# Patient Record
Sex: FEMALE | Race: WHITE | HISPANIC OR LATINO | Employment: OTHER | URBAN - METROPOLITAN AREA
[De-identification: names, ages, dates, MRNs, and addresses within clinical notes are randomized per-mention and may not be internally consistent; named-entity substitution may affect disease eponyms.]

---

## 2018-02-27 ENCOUNTER — OFFICE VISIT CONVERTED (OUTPATIENT)
Dept: FAMILY MEDICINE CLINIC | Facility: CLINIC | Age: 63
End: 2018-02-27
Attending: FAMILY MEDICINE

## 2018-02-27 ENCOUNTER — CONVERSION ENCOUNTER (OUTPATIENT)
Dept: FAMILY MEDICINE CLINIC | Facility: CLINIC | Age: 63
End: 2018-02-27

## 2018-09-18 ENCOUNTER — OFFICE VISIT CONVERTED (OUTPATIENT)
Dept: FAMILY MEDICINE CLINIC | Facility: CLINIC | Age: 63
End: 2018-09-18
Attending: FAMILY MEDICINE

## 2018-09-18 ENCOUNTER — CONVERSION ENCOUNTER (OUTPATIENT)
Dept: FAMILY MEDICINE CLINIC | Facility: CLINIC | Age: 63
End: 2018-09-18

## 2019-09-13 ENCOUNTER — HOSPITAL ENCOUNTER (OUTPATIENT)
Dept: LAB | Facility: HOSPITAL | Age: 64
Discharge: HOME | End: 2019-09-13
Attending: FAMILY MEDICINE

## 2019-09-13 LAB
25(OH)D3 SERPL-MCNC: 75.5 NG/ML (ref 30–100)
ALBUMIN SERPL-MCNC: 4.2 G/DL (ref 3.5–5)
ALBUMIN/GLOB SERPL: 1.3 {RATIO} (ref 1.4–2.6)
ALP SERPL-CCNC: 118 U/L (ref 43–160)
ALT SERPL-CCNC: 45 U/L (ref 10–40)
ANION GAP SERPL CALC-SCNC: 17 MMOL/L (ref 8–19)
APPEARANCE UR: CLEAR
AST SERPL-CCNC: 36 U/L (ref 15–50)
BILIRUB SERPL-MCNC: 0.63 MG/DL (ref 0.2–1.3)
BILIRUB UR QL: NEGATIVE
BUN SERPL-MCNC: 13 MG/DL (ref 5–25)
BUN/CREAT SERPL: 16 {RATIO} (ref 6–20)
CALCIUM SERPL-MCNC: 9.8 MG/DL (ref 8.7–10.4)
CHLORIDE SERPL-SCNC: 100 MMOL/L (ref 99–111)
CHOLEST SERPL-MCNC: 214 MG/DL (ref 107–200)
CHOLEST/HDLC SERPL: 5.1 {RATIO} (ref 3–6)
COLOR UR: YELLOW
CONV CO2: 25 MMOL/L (ref 22–32)
CONV COLLECTION SOURCE (UA): NORMAL
CONV TOTAL PROTEIN: 7.5 G/DL (ref 6.3–8.2)
CONV UROBILINOGEN IN URINE BY AUTOMATED TEST STRIP: 1 {EHRLICHU}/DL (ref 0.1–1)
CREAT UR-MCNC: 0.82 MG/DL (ref 0.5–0.9)
EST. AVERAGE GLUCOSE BLD GHB EST-MCNC: 131 MG/DL
GFR SERPLBLD BASED ON 1.73 SQ M-ARVRAT: >60 ML/MIN/{1.73_M2}
GLOBULIN UR ELPH-MCNC: 3.3 G/DL (ref 2–3.5)
GLUCOSE SERPL-MCNC: 115 MG/DL (ref 65–99)
GLUCOSE UR QL: NEGATIVE MG/DL
HBA1C MFR BLD: 6.2 % (ref 3.5–5.7)
HDLC SERPL-MCNC: 42 MG/DL (ref 40–60)
HGB UR QL STRIP: NEGATIVE
KETONES UR QL STRIP: NEGATIVE MG/DL
LDLC SERPL CALC-MCNC: 142 MG/DL (ref 70–100)
LEUKOCYTE ESTERASE UR QL STRIP: NEGATIVE
NITRITE UR QL STRIP: NEGATIVE
OSMOLALITY SERPL CALC.SUM OF ELEC: 287 MOSM/KG (ref 273–304)
PH UR STRIP.AUTO: 6.5 [PH] (ref 5–8)
POTASSIUM SERPL-SCNC: 4.2 MMOL/L (ref 3.5–5.3)
PROT UR QL: NEGATIVE MG/DL
SODIUM SERPL-SCNC: 138 MMOL/L (ref 135–147)
SP GR UR: 1.02 (ref 1–1.03)
TRIGL SERPL-MCNC: 152 MG/DL (ref 40–150)
TSH SERPL-ACNC: 0.51 M[IU]/L (ref 0.27–4.2)
VLDLC SERPL-MCNC: 30 MG/DL (ref 5–37)

## 2019-09-16 ENCOUNTER — OFFICE VISIT CONVERTED (OUTPATIENT)
Dept: FAMILY MEDICINE CLINIC | Facility: CLINIC | Age: 64
End: 2019-09-16
Attending: FAMILY MEDICINE

## 2019-10-03 ENCOUNTER — HOSPITAL ENCOUNTER (OUTPATIENT)
Dept: GENERAL RADIOLOGY | Facility: HOSPITAL | Age: 64
Discharge: HOME | End: 2019-10-03
Attending: FAMILY MEDICINE

## 2020-01-07 ENCOUNTER — HOSPITAL ENCOUNTER (OUTPATIENT)
Dept: URGENT CARE | Facility: CLINIC | Age: 65
Discharge: HOME | End: 2020-01-07
Attending: FAMILY MEDICINE

## 2020-01-07 LAB — GLUCOSE BLD-MCNC: 93 MG/DL (ref 65–99)

## 2020-01-10 LAB
AMOXICILLIN+CLAV SUSC ISLT: <=2
AMPICILLIN SUSC ISLT: 4
AMPICILLIN+SULBAC SUSC ISLT: <=2
BACTERIA UR CULT: ABNORMAL
CEFAZOLIN SUSC ISLT: <=4
CEFEPIME SUSC ISLT: <=1
CEFTAZIDIME SUSC ISLT: <=1
CEFTRIAXONE SUSC ISLT: <=1
CEFUROXIME ORAL SUSC ISLT: 4
CEFUROXIME PARENTER SUSC ISLT: 4
CIPROFLOXACIN SUSC ISLT: <=0.25
ERTAPENEM SUSC ISLT: <=0.5
GENTAMICIN SUSC ISLT: <=1
LEVOFLOXACIN SUSC ISLT: <=0.12
NITROFURANTOIN SUSC ISLT: <=16
TETRACYCLINE SUSC ISLT: <=1
TMP SMX SUSC ISLT: <=20
TOBRAMYCIN SUSC ISLT: <=1

## 2020-03-16 ENCOUNTER — HOSPITAL ENCOUNTER (OUTPATIENT)
Dept: LAB | Facility: HOSPITAL | Age: 65
Discharge: HOME | End: 2020-03-16
Attending: FAMILY MEDICINE

## 2020-03-16 LAB
25(OH)D3 SERPL-MCNC: 73 NG/ML (ref 30–100)
ALBUMIN SERPL-MCNC: 4.3 G/DL (ref 3.5–5)
ALBUMIN/GLOB SERPL: 1.3 {RATIO} (ref 1.4–2.6)
ALP SERPL-CCNC: 136 U/L (ref 43–160)
ALT SERPL-CCNC: 27 U/L (ref 10–40)
ANION GAP SERPL CALC-SCNC: 14 MMOL/L (ref 8–19)
APPEARANCE UR: CLEAR
AST SERPL-CCNC: 21 U/L (ref 15–50)
BILIRUB SERPL-MCNC: 0.47 MG/DL (ref 0.2–1.3)
BILIRUB UR QL: NEGATIVE
BUN SERPL-MCNC: 10 MG/DL (ref 5–25)
BUN/CREAT SERPL: 11 {RATIO} (ref 6–20)
CALCIUM SERPL-MCNC: 9.8 MG/DL (ref 8.7–10.4)
CHLORIDE SERPL-SCNC: 102 MMOL/L (ref 99–111)
CHOLEST SERPL-MCNC: 173 MG/DL (ref 107–200)
CHOLEST/HDLC SERPL: 3.7 {RATIO} (ref 3–6)
COLOR UR: YELLOW
CONV CO2: 26 MMOL/L (ref 22–32)
CONV COLLECTION SOURCE (UA): NORMAL
CONV TOTAL PROTEIN: 7.5 G/DL (ref 6.3–8.2)
CONV UROBILINOGEN IN URINE BY AUTOMATED TEST STRIP: 0.2 {EHRLICHU}/DL (ref 0.1–1)
CREAT UR-MCNC: 0.91 MG/DL (ref 0.5–0.9)
EST. AVERAGE GLUCOSE BLD GHB EST-MCNC: 114 MG/DL
GFR SERPLBLD BASED ON 1.73 SQ M-ARVRAT: >60 ML/MIN/{1.73_M2}
GLOBULIN UR ELPH-MCNC: 3.2 G/DL (ref 2–3.5)
GLUCOSE SERPL-MCNC: 91 MG/DL (ref 65–99)
GLUCOSE UR QL: NEGATIVE MG/DL
HBA1C MFR BLD: 5.6 % (ref 3.5–5.7)
HDLC SERPL-MCNC: 47 MG/DL (ref 40–60)
HGB UR QL STRIP: NEGATIVE
KETONES UR QL STRIP: NEGATIVE MG/DL
LDLC SERPL CALC-MCNC: 102 MG/DL (ref 70–100)
LEUKOCYTE ESTERASE UR QL STRIP: NEGATIVE
NITRITE UR QL STRIP: NEGATIVE
OSMOLALITY SERPL CALC.SUM OF ELEC: 285 MOSM/KG (ref 273–304)
PH UR STRIP.AUTO: 5 [PH] (ref 5–8)
POTASSIUM SERPL-SCNC: 4 MMOL/L (ref 3.5–5.3)
PROT UR QL: NEGATIVE MG/DL
SODIUM SERPL-SCNC: 138 MMOL/L (ref 135–147)
SP GR UR: 1.02 (ref 1–1.03)
TRIGL SERPL-MCNC: 121 MG/DL (ref 40–150)
TSH SERPL-ACNC: 0.05 M[IU]/L (ref 0.27–4.2)
VLDLC SERPL-MCNC: 24 MG/DL (ref 5–37)

## 2020-03-23 ENCOUNTER — TELEMEDICINE CONVERTED (OUTPATIENT)
Dept: FAMILY MEDICINE CLINIC | Facility: CLINIC | Age: 65
End: 2020-03-23
Attending: FAMILY MEDICINE

## 2020-09-15 ENCOUNTER — HOSPITAL ENCOUNTER (OUTPATIENT)
Dept: LAB | Facility: HOSPITAL | Age: 65
Discharge: HOME | End: 2020-09-15
Attending: FAMILY MEDICINE

## 2020-09-15 LAB
25(OH)D3 SERPL-MCNC: 84.2 NG/ML (ref 30–100)
ALBUMIN SERPL-MCNC: 4.1 G/DL (ref 3.5–5)
ALBUMIN/GLOB SERPL: 1.3 {RATIO} (ref 1.4–2.6)
ALP SERPL-CCNC: 115 U/L (ref 43–160)
ALT SERPL-CCNC: 27 U/L (ref 10–40)
ANION GAP SERPL CALC-SCNC: 16 MMOL/L (ref 8–19)
APPEARANCE UR: CLEAR
AST SERPL-CCNC: 24 U/L (ref 15–50)
BILIRUB SERPL-MCNC: 0.51 MG/DL (ref 0.2–1.3)
BILIRUB UR QL: NEGATIVE
BUN SERPL-MCNC: 13 MG/DL (ref 5–25)
BUN/CREAT SERPL: 13 {RATIO} (ref 6–20)
CALCIUM SERPL-MCNC: 9.8 MG/DL (ref 8.7–10.4)
CHLORIDE SERPL-SCNC: 105 MMOL/L (ref 99–111)
CHOLEST SERPL-MCNC: 200 MG/DL (ref 107–200)
CHOLEST/HDLC SERPL: 3.9 {RATIO} (ref 3–6)
COLOR UR: YELLOW
CONV CO2: 24 MMOL/L (ref 22–32)
CONV COLLECTION SOURCE (UA): NORMAL
CONV TOTAL PROTEIN: 7.2 G/DL (ref 6.3–8.2)
CONV UROBILINOGEN IN URINE BY AUTOMATED TEST STRIP: 0.2 {EHRLICHU}/DL (ref 0.1–1)
CREAT UR-MCNC: 0.98 MG/DL (ref 0.5–0.9)
EST. AVERAGE GLUCOSE BLD GHB EST-MCNC: 114 MG/DL
GFR SERPLBLD BASED ON 1.73 SQ M-ARVRAT: >60 ML/MIN/{1.73_M2}
GLOBULIN UR ELPH-MCNC: 3.1 G/DL (ref 2–3.5)
GLUCOSE SERPL-MCNC: 108 MG/DL (ref 65–99)
GLUCOSE UR QL: NEGATIVE MG/DL
HBA1C MFR BLD: 5.6 % (ref 3.5–5.7)
HDLC SERPL-MCNC: 51 MG/DL (ref 40–60)
HGB UR QL STRIP: NEGATIVE
KETONES UR QL STRIP: NEGATIVE MG/DL
LDLC SERPL CALC-MCNC: 125 MG/DL (ref 70–100)
LEUKOCYTE ESTERASE UR QL STRIP: NEGATIVE
NITRITE UR QL STRIP: NEGATIVE
OSMOLALITY SERPL CALC.SUM OF ELEC: 293 MOSM/KG (ref 273–304)
PH UR STRIP.AUTO: 5 [PH] (ref 5–8)
POTASSIUM SERPL-SCNC: 3.8 MMOL/L (ref 3.5–5.3)
PROT UR QL: NEGATIVE MG/DL
SODIUM SERPL-SCNC: 141 MMOL/L (ref 135–147)
SP GR UR: 1.02 (ref 1–1.03)
T4 FREE SERPL-MCNC: 1.5 NG/DL (ref 0.9–1.8)
TRIGL SERPL-MCNC: 120 MG/DL (ref 40–150)
TSH SERPL-ACNC: 0.1 M[IU]/L (ref 0.27–4.2)
VLDLC SERPL-MCNC: 24 MG/DL (ref 5–37)

## 2020-09-16 LAB — T3FREE SERPL-MCNC: 3.7 PG/ML (ref 2–4.4)

## 2020-09-24 ENCOUNTER — OFFICE VISIT CONVERTED (OUTPATIENT)
Dept: FAMILY MEDICINE CLINIC | Facility: CLINIC | Age: 65
End: 2020-09-24
Attending: FAMILY MEDICINE

## 2020-09-24 ENCOUNTER — CONVERSION ENCOUNTER (OUTPATIENT)
Dept: FAMILY MEDICINE CLINIC | Facility: CLINIC | Age: 65
End: 2020-09-24

## 2020-11-12 ENCOUNTER — HOSPITAL ENCOUNTER (OUTPATIENT)
Dept: ULTRASOUND IMAGING | Facility: HOSPITAL | Age: 65
Discharge: HOME | End: 2020-11-12
Attending: FAMILY MEDICINE

## 2020-12-08 ENCOUNTER — HOSPITAL ENCOUNTER (OUTPATIENT)
Dept: MAMMOGRAPHY | Facility: HOSPITAL | Age: 65
Discharge: HOME | End: 2020-12-08
Attending: FAMILY MEDICINE

## 2020-12-08 LAB
T4 FREE SERPL-MCNC: 1.3 NG/DL (ref 0.9–1.8)
TSH SERPL-ACNC: 0.36 M[IU]/L (ref 0.27–4.2)

## 2020-12-09 LAB — T3FREE SERPL-MCNC: 3.3 PG/ML (ref 2–4.4)

## 2021-02-08 ENCOUNTER — HOSPITAL ENCOUNTER (OUTPATIENT)
Dept: MAMMOGRAPHY | Facility: HOSPITAL | Age: 66
Discharge: HOME | End: 2021-02-08
Attending: FAMILY MEDICINE

## 2021-03-24 ENCOUNTER — HOSPITAL ENCOUNTER (OUTPATIENT)
Dept: LAB | Facility: HOSPITAL | Age: 66
Discharge: HOME | End: 2021-03-24
Attending: FAMILY MEDICINE

## 2021-03-24 LAB
ALBUMIN SERPL-MCNC: 4.1 G/DL (ref 3.5–5)
ALBUMIN/GLOB SERPL: 1.3 {RATIO} (ref 1.4–2.6)
ALP SERPL-CCNC: 126 U/L (ref 43–160)
ALT SERPL-CCNC: 24 U/L (ref 10–40)
ANION GAP SERPL CALC-SCNC: 18 MMOL/L (ref 8–19)
APPEARANCE UR: CLEAR
AST SERPL-CCNC: 23 U/L (ref 15–50)
BILIRUB SERPL-MCNC: 0.48 MG/DL (ref 0.2–1.3)
BILIRUB UR QL: NEGATIVE
BUN SERPL-MCNC: 11 MG/DL (ref 5–25)
BUN/CREAT SERPL: 11 {RATIO} (ref 6–20)
CALCIUM SERPL-MCNC: 9.5 MG/DL (ref 8.7–10.4)
CHLORIDE SERPL-SCNC: 105 MMOL/L (ref 99–111)
CHOLEST SERPL-MCNC: 193 MG/DL (ref 107–200)
CHOLEST/HDLC SERPL: 3.9 {RATIO} (ref 3–6)
COLOR UR: YELLOW
CONV CO2: 23 MMOL/L (ref 22–32)
CONV COLLECTION SOURCE (UA): ABNORMAL
CONV TOTAL PROTEIN: 7.3 G/DL (ref 6.3–8.2)
CONV UROBILINOGEN IN URINE BY AUTOMATED TEST STRIP: 0.2 {EHRLICHU}/DL (ref 0.1–1)
CREAT UR-MCNC: 1.01 MG/DL (ref 0.5–0.9)
EST. AVERAGE GLUCOSE BLD GHB EST-MCNC: 105 MG/DL
GFR SERPLBLD BASED ON 1.73 SQ M-ARVRAT: 59 ML/MIN/{1.73_M2}
GLOBULIN UR ELPH-MCNC: 3.2 G/DL (ref 2–3.5)
GLUCOSE SERPL-MCNC: 96 MG/DL (ref 65–99)
GLUCOSE UR QL: NEGATIVE MG/DL
HBA1C MFR BLD: 5.3 % (ref 3.5–5.7)
HDLC SERPL-MCNC: 50 MG/DL (ref 40–60)
HGB UR QL STRIP: NEGATIVE
KETONES UR QL STRIP: NEGATIVE MG/DL
LDLC SERPL CALC-MCNC: 112 MG/DL (ref 70–100)
LEUKOCYTE ESTERASE UR QL STRIP: NEGATIVE
NITRITE UR QL STRIP: NEGATIVE
OSMOLALITY SERPL CALC.SUM OF ELEC: 293 MOSM/KG (ref 273–304)
PH UR STRIP.AUTO: 5.5 [PH] (ref 5–8)
POTASSIUM SERPL-SCNC: 4 MMOL/L (ref 3.5–5.3)
PROT UR QL: NEGATIVE MG/DL
SODIUM SERPL-SCNC: 142 MMOL/L (ref 135–147)
SP GR UR: >=1.03 (ref 1–1.03)
T4 FREE SERPL-MCNC: 1.3 NG/DL (ref 0.9–1.8)
TRIGL SERPL-MCNC: 155 MG/DL (ref 40–150)
TSH SERPL-ACNC: 0.11 M[IU]/L (ref 0.27–4.2)
VLDLC SERPL-MCNC: 31 MG/DL (ref 5–37)

## 2021-03-25 LAB
25(OH)D3 SERPL-MCNC: 84.3 NG/ML (ref 30–100)
T3FREE SERPL-MCNC: 3.7 PG/ML (ref 2–4.4)

## 2021-03-30 ENCOUNTER — OFFICE VISIT CONVERTED (OUTPATIENT)
Dept: FAMILY MEDICINE CLINIC | Facility: CLINIC | Age: 66
End: 2021-03-30
Attending: FAMILY MEDICINE

## 2021-04-16 ENCOUNTER — HOSPITAL ENCOUNTER (OUTPATIENT)
Dept: GENERAL RADIOLOGY | Facility: HOSPITAL | Age: 66
Discharge: HOME | End: 2021-04-16
Attending: FAMILY MEDICINE

## 2021-05-03 ENCOUNTER — HOSPITAL ENCOUNTER (OUTPATIENT)
Dept: URGENT CARE | Facility: CLINIC | Age: 66
Discharge: HOME | End: 2021-05-03
Attending: FAMILY MEDICINE

## 2021-05-04 LAB — SARS-COV-2 RNA SPEC QL NAA+PROBE: NOT DETECTED

## 2021-05-05 LAB — BACTERIA SPEC AEROBE CULT: NORMAL

## 2021-05-13 NOTE — PROGRESS NOTES
Progress Note      Patient Name: Delores Kaur   Patient ID: [id number]   Sex: Female   YOB: 1955    Primary Care Provider: Vinicio Renee MD   Referring Provider: Vinicio Renee MD    Visit Date: September 24, 2020    Provider: Vinicio Renee MD   Location: [facility]   Location Address: [address] [place] [address], [address] [place] [address]  [id number]   Location Phone: [phone]          Chief Complaint     6 mo f/u         History Of Present Illness  Delores Kaur is a 65 year old /White,  or  female who presents for evaluation and treatment of:      f.u    Mother hx of myelodysplastic syndrome. Father hx of salivary cancer.    hx of HLD.. controlled.     Glucose mildly elevated, A1c from 5.8 to 6.2. to 5.6.     Hx of low vitatmin D. controlled on the vitamin D supplement.    Kidney function is abnormal in past, just mildly abnormal.    hx of thyroid nodules. the FNA done by ENT was negative in 2017.  TSH is mildly hyperfunctionig today.    THe LFT is normal.     Pt c/o weight gain/obesity.. she has lost about 20 lbs since last visit.       Past Medical History  Disease Name Date Onset Notes   Broken Bones --  broken humerous right.    Thyroid nodule --  --          Past Surgical History  Procedure Name Date Notes   Cesarian Section 1983 --    Hysterectomy 2000 total. due to a cyst.   Thyroid FNA 2017 --    Tonsillectomy 1969 --          Medication List  Name Date Started Instructions   cholecalciferol (vitamin D3) 1,250 mcg (50,000 unit) oral capsule 06/29/2020 TAKE 1 CAPSULE BY MOUTH WEEKLY   Ozempic 0.25 mg or 0.5 mg(2 mg/1.5 mL) subcutaneous pen injector 04/13/2020 inject 0.4 milliliter (0.5 mg) by subcutaneous route once weekly on the same day of each week, in the abdomen, thighs, or upper arm rotating injection sites for 90 days   Xiidra 5 % ophthalmic dropperette  instill 1 drop into both eyes by ophthalmic route 2 times per day approximately 12 hours  "apart         Allergy List  Allergen Name Date Reaction Notes   NO KNOWN DRUG ALLERGIES --  --  --        Allergies Reconciled  Family Medical History  Disease Name Relative/Age Notes   Cancer, Unspecified Father/   Oral cancer   Breast Neoplasm Aunt/   --          Reproductive History  Menstrual   Pregnancy Summary   Total Pregnancies: 3 Full Term: 3 Premature: 0   Ab Induced: 0 Ab Spontaneous: 0 Ectopics: 0   Multiples: 0 Livin         Social History  Finding Status Start/Stop Quantity Notes   Tobacco Former --/-- 1 1/2 pk/day Quit in          Immunizations  NameDate Admin Mfg Trade Name Lot Number Route Inj VIS Given VIS Publication   Txvcwsjky84/24/2020 PMC Fluzone Quadrivalent [id number] IM LD 2020    Comments: pt tolerated well   InfluenzaUnknown NE Not Entered  NE NE     Comments: 10/2018   Fwceifcws7535/24/2020 MSD PNEUMOVAX 23 [id number] IM RD 2020    Comments: pt tolerated well   Prevnar 1308 WAL PREVNAR 13 [id number] IM LD 2017   Comments:    Tdap2017 SKB BOOSTRIX [id number] IM RD 2017   Comments:          Review of Systems  · Cardiovascular  o Denies  o : pedal edema, claudication, chest pressure, palpitations  · Respiratory  o Denies  o : shortness of breath, wheezing, cough, hemoptysis, dyspnea on exertion  · Gastrointestinal  o Denies  o : nausea, vomiting, diarrhea, constipation, abdominal pain      Vitals  Date Time BP Position Site L\R Cuff Size HR RR TEMP (F) WT  HT  BMI kg/m2 BSA m2 O2 Sat        2020 09:13 /60 Sitting    77 - R  97.6 195lbs 6oz 5'  3\" 34.61 1.98 98 %          Physical Examination  · Constitutional  o Appearance  o : alert, in no acute distress, well developed, well-nourished  · Head and Face  o Head  o : normocephalic, atraumatic, non tender, no palpable masses or nodules.  o Face  o : no facial lesions  · Eyes  o Vision  o : Acuity: grossly normal at distance, Conjuntivae: Normal, Sclerae " white  · Respiratory  o Auscultation of Lungs  o : normal breath sounds throughout  · Cardiovascular  o Heart  o : Regular rate and rhythm, Normal S1,S2   · Psychiatric  o Mood and Affect  o : normal mood and affect              Assessment  · Visit for screening mammogram     V76.12/Z12.31  · Abnormal thyroid function test     794.5/R94.6  · Hyperthyroidism     242.90/E05.90  · Fatigue     780.79/R53.83  · Hyperlipidemia     272.4/E78.5  · Elevated glucose     790.29/R73.09  · Vitamin D deficiency     268.9/E55.9  · Need for pneumococcal vaccination     V03.82/Z23       repeat thyrod in 3 more months...it is improving. will do thyroid uptake and scan and ultrasound.   mammogram due.  next visit CPX.    labs are improved overall.       Plan  · Orders  o TSH and FT4 (39019, 50117, THYII) - 794.5/R94.6, 242.90/E05.90 - 12/08/2020  o Free T3 (18604) - 794.5/R94.6, 242.90/E05.90 - 12/08/2020  o CMP [facility] (53130) - 242.90/E05.90, 780.79/R53.83, 272.4/E78.5, 790.29/R73.09 - 03/24/2021  o Hgb A1c [facility] (16351) - 790.29/R73.09 - 03/24/2021  o Lipid Panel [facility] (19735) - 272.4/E78.5 - 03/24/2021  o TSH and FT4 (90644, 93335, THYII) - 794.5/R94.6, 242.90/E05.90 - 03/24/2021  o Free T3 (46874) - 794.5/R94.6, 242.90/E05.90 - 03/24/2021  o Urinalysis with Reflex Microscopy if abnormal ([facility]) (32797) - 780.79/R53.83, 790.29/R73.09 - 03/24/2021  o Vitamin D (25-Hydroxy) Level (77419) - 268.9/E55.9 - 03/24/2021  o Thyroid Uptake and Scan I123 [facility] (46113) - 242.90/E05.90 - 10/17/2020  o Mammogram breast screening 3D digital bilateral (69156, 78315, ) - V76.12/Z12.31 - 09/24/2020  o IM/SQ - Injection Fee [facility] (50350) - V03.82/Z23 - 09/24/2020  o Jjasjnoof99 Vaccine (25223) - V03.82/Z23 - 09/24/2020   Vaccine - Vrnqpdiqr27; Dose: 0.50; Site: Right Deltoid; Route: Intramuscular; Date: 09/24/2020 10:03:00; Exp: 03/13/2022; Lot: [id number]; Mfg: [employer] TradeName: PNEUMOVAX 23; Administered By: Tracey Soares MA; Comment: pt  tolerated well  o Fluzone Quadrivalent Vaccine, age 6 months + (91491) - - 09/24/2020   Vaccine - Influenza; Dose: 0.5; Site: Left Deltoid; Route: Intramuscular; Date: 09/24/2020 10:04:00; Exp: 06/30/2021; Lot: [id number]; Mfg: sanofi pasteur; TradeName: Fluzone Quadrivalent; Administered By: Tracey Soares MA; Comment: pt tolerated well  o ACO-14: Influenza immunization administered or previously received () - - 09/24/2020  o ACO-39: Current medications updated and reviewed () - - 09/24/2020  · Medications  o Macrobid 100 mg oral capsule   SIG: take 1 capsule (100 mg) by oral route every 12 hours with food for 7 days   DISP: (14) capsules with 0 refills  Discontinued on 09/24/2020     o Mucinex 600 mg oral tablet extended release 12hr   SIG: take 1 tablet (600 mg) by oral route every 12 hours as needed   DISP: (30) Tablet extended release 12hrs with 0 refills  Discontinued on 09/24/2020     o Medications have been Reconciled  o Transition of Care or Provider Policy  · Instructions  o Electronically Identified Patient Education Materials Provided Electronically  · Disposition  o Call or Return if symptoms worsen or persist.  o Care Transition            Electronically Signed by: Vinicio Renee MD -Author on September 24, 2020 12:30:45 PM

## 2021-05-14 VITALS
HEIGHT: 63 IN | WEIGHT: 203 LBS | BODY MASS INDEX: 35.97 KG/M2 | OXYGEN SATURATION: 97 % | RESPIRATION RATE: 18 BRPM | TEMPERATURE: 97.1 F | DIASTOLIC BLOOD PRESSURE: 73 MMHG | SYSTOLIC BLOOD PRESSURE: 120 MMHG | HEART RATE: 71 BPM

## 2021-05-14 VITALS
DIASTOLIC BLOOD PRESSURE: 60 MMHG | WEIGHT: 195.37 LBS | OXYGEN SATURATION: 98 % | HEART RATE: 77 BPM | BODY MASS INDEX: 34.62 KG/M2 | SYSTOLIC BLOOD PRESSURE: 120 MMHG | HEIGHT: 63 IN | TEMPERATURE: 97.6 F

## 2021-05-14 NOTE — PROGRESS NOTES
Progress Note      Patient Name: Delores Kaur   Patient ID: [id number]   Sex: Female   YOB: 1955    Primary Care Provider: Vinicio Renee MD   Referring Provider: Vinicio Renee MD    Visit Date: March 30, 2021    Provider: Vinicio Renee MD   Location: [facility]   Location Address: [address] [place] [address], [address] [place] [address]  [id number]   Location Phone: [phone]          Chief Complaint  · Annual Exam  · (Health Maintainence Information Reviewed Under Results)      History Of Present Illness  Delores Kaur is a 66 year old /White,  or  female who presents for evaluation and treatment of:   Last PAP Smear: no.   Date of Last Mammogram: 2021.   Date of Last Colonoscopy: Unknown       Past Medical History  Disease Name Date Onset Notes   Broken Bones --  broken humerous right.    Thyroid nodule --  --          Past Surgical History  Procedure Name Date Notes   Cesarian Section 1983 --    Hysterectomy 2000 total. due to a cyst.   Thyroid FNA 2017 --    Tonsillectomy 1969 --          Medication List  Name Date Started Instructions   cholecalciferol (vitamin D3) 1,250 mcg (50,000 unit) oral capsule 03/30/2021 TAKE 1 CAPSULE BY MOUTH WEEKLY   Ozempic 0.25 mg or 0.5 mg(2 mg/1.5 mL) subcutaneous pen injector 03/30/2021 inject 0.4 milliliter (0.5 mg) by subcutaneous route once weekly on the same day of each week, in the abdomen, thighs, or upper arm rotating injection sites for 90 days   Xiidra 5 % ophthalmic dropperette  instill 1 drop into both eyes by ophthalmic route 2 times per day approximately 12 hours apart         Allergy List  Allergen Name Date Reaction Notes   NO KNOWN DRUG ALLERGIES --  --  --        Allergies Reconciled  Family Medical History  Disease Name Relative/Age Notes   Cancer, Unspecified Father/   Oral cancer   Breast Neoplasm Aunt/   --          Reproductive History  Menstrual   Pregnancy Summary   Total Pregnancies: 3 Full Term: 3  "Premature: 0   Ab Induced: 0 Ab Spontaneous: 0 Ectopics: 0   Multiples: 0 Livin         Social History  Finding Status Start/Stop Quantity Notes   Tobacco Former --/-- 1 1/2 pk/day Quit in          Immunizations  NameDate Admin Mfg Trade Name Lot Number Route Inj VIS Given VIS Publication   Qkoptttil69/24/2020 PMC Fluzone Quadrivalent [id number] IM LD 2020    Comments: pt tolerated well   Cvddyaptn8121/24/2020 MSD PNEUMOVAX 23 [id number] IM RD 2020    Comments: pt tolerated well   Prevnar 1302017 WAL PREVNAR 13 [id number] IM LD 2017   Comments:    Tdap2017 SKB BOOSTRIX [id number] IM RD 2017   Comments:          Review of Systems  · Constitutional  o Denies  o : night sweats  · Eyes  o Denies  o : double vision, blurred vision  · HENT  o Denies  o : vertigo, recent head injury  · Breasts  o Denies  o : abnormal changes in breast size, additional breast symptoms except as noted in the HPI  · Cardiovascular  o Denies  o : chest pain, irregular heart beats  · Respiratory  o Denies  o : shortness of breath, productive cough  · Gastrointestinal  o Denies  o : nausea, vomiting  · Genitourinary  o Denies  o : dysuria, urinary retention  · Integument  o Denies  o : hair growth change, new skin lesions  · Neurologic  o Denies  o : altered mental status, seizures  · Musculoskeletal  o Denies  o : joint swelling, limitation of motion  · Endocrine  o Denies  o : cold intolerance, heat intolerance  · Heme-Lymph  o Denies  o : petechiae, lymph node enlargement or tenderness  · Allergic-Immunologic  o Denies  o : frequent illnesses      Vitals  Date Time BP Position Site L\R Cuff Size HR RR TEMP (F) WT  HT  BMI kg/m2 BSA m2 O2 Sat FR L/min FiO2 HC       2021 08:25 /73 Sitting    71 - R 18 97.1 203lbs 0oz 5'  3\" 35.96 2.02 97 %  21%          Physical Examination  · Constitutional  o Appearance  o : well-nourished, in no acute distress  · Neck  o Inspection/Palpation  o : " normal appearance, no masses or tenderness, trachea midline  o Thyroid  o : gland size normal, nontender, no nodules or masses present on palpation  · Respiratory  o Respiratory Effort  o : breathing unlabored  o Inspection of Chest  o : normal appearance  o Auscultation of Lungs  o : normal breath sounds throughout  · Cardiovascular  o Heart  o :   § Auscultation of Heart  § : regular rate and rhythm  · Gastrointestinal  o Abdominal Examination  o : abdomen nontender to palpation, tone normal without rigidity or guarding, no masses present, normal bowel sounds  · Psychiatric  o Judgement and Insight  o : judgment and insight intact  o Mood and Affect  o : mood normal, affect appropriate              Assessment  · Annual physical exam     V70.0/Z00.00  · Fatigue     780.79/R53.83  · Vitamin D deficiency     268.9/E55.9  · Post menopausal syndrome     627.9/N95.1  · Screening for breast cancer     V76.10/Z12.39  · HLD (hyperlipidemia)     272.4/E78.5  · Abnormal thyroid function test     794.5/R94.6  · Elevated glucose     790.29/R73.09  · Immunity status testing     V72.61/Z01.84       she needs mammogram left breast in AUgust  screening mammogram in Dec.  need last cscope report from [facility].  Dexa due.   goal is to lose 5 lbs.   ultrasound kidneys..due to abnormal kidney function.       Plan  · Orders  o ACO-14: Influenza immunization administered or previously received [facility] () - - 03/30/2021  o ACO-39: Current medications updated and reviewed (1159F, ) - - 03/30/2021  o CMP [facility] (11037) - 272.4/E78.5, 794.5/R94.6, 790.29/R73.09, 780.79/R53.83 - 09/30/2021  o Hgb A1c [facility] (25480) - 272.4/E78.5, 794.5/R94.6, 790.29/R73.09, 780.79/R53.83 - 09/30/2021  o Lipid Panel [facility] (30185) - 272.4/E78.5 - 09/30/2021  o TSH and FT4 (95140, 31501, THYII) - 272.4/E78.5, 794.5/R94.6, 790.29/R73.09, 780.79/R53.83 - 09/30/2021  o Free T3 (19925) - 272.4/E78.5, 794.5/R94.6, 780.79/R53.83, 268.9/E55.9 - 09/30/2021  o Urinalysis  with Reflex Microscopy (90122) - 272.4/E78.5, 794.5/R94.6, 790.29/R73.09, 780.79/R53.83 - 09/30/2021  o Vitamin D (25-Hydroxy) Level (17142) - 780.79/R53.83, 268.9/E55.9 - 09/30/2021  o Mammogram diagnostic 3D breast unilateral LEFT (w/US if needed) [facility] (, -[facility]) - V76.10/Z12.39 - 08/30/2021  o Mammogram breast screening 3D digital bilateral (02420, 59105, ) - V76.10/Z12.39 - 12/30/2021  o Bone density scan (36923) - 627.9/N95.1 - 03/30/2021  o Varicella Zoster Antibody (IgG) (35734) - V72.61/Z01.84 - 09/30/2021  o Varicella Zoster IgM [facility] (44091) - V72.61/Z01.84 - 09/30/2021  · Medications  o Medications have been Reconciled  o Transition of Care or Provider Policy  · Instructions  o Reviewed health maintenance flowsheet and updated information. Orders were placed and/or patient's response was documented.  o Patient was educated/instructed on their diagnosis, treatment and medications prior to discharge from the clinic today.  o Counseled on monthly breast self exams.   o Counseled on STD prevention.  o Counseled on diet and exercise.   o Counseled on weight-bearing exercise.  o Recommended Calcium with Vitamin D twice daily.  o Electronically Identified Patient Education Materials Provided Electronically  · Disposition  o Call or Return if symptoms worsen or persist.  o Care Transition            Electronically Signed by: Vinicio Renee MD -Author on March 30, 2021 12:43:49 PM

## 2021-05-15 VITALS
SYSTOLIC BLOOD PRESSURE: 118 MMHG | BODY MASS INDEX: 38.64 KG/M2 | OXYGEN SATURATION: 98 % | DIASTOLIC BLOOD PRESSURE: 58 MMHG | HEART RATE: 63 BPM | TEMPERATURE: 97.3 F | WEIGHT: 218.06 LBS | HEIGHT: 63 IN

## 2021-05-16 VITALS
DIASTOLIC BLOOD PRESSURE: 57 MMHG | BODY MASS INDEX: 39.34 KG/M2 | HEIGHT: 63 IN | WEIGHT: 222 LBS | TEMPERATURE: 97.5 F | OXYGEN SATURATION: 96 % | HEART RATE: 70 BPM | SYSTOLIC BLOOD PRESSURE: 107 MMHG

## 2021-05-16 VITALS
DIASTOLIC BLOOD PRESSURE: 60 MMHG | SYSTOLIC BLOOD PRESSURE: 123 MMHG | HEART RATE: 69 BPM | HEIGHT: 63 IN | WEIGHT: 218.25 LBS | OXYGEN SATURATION: 97 % | TEMPERATURE: 97.7 F | BODY MASS INDEX: 38.67 KG/M2

## 2021-05-22 ENCOUNTER — TRANSCRIBE ORDERS (OUTPATIENT)
Dept: ADMINISTRATIVE | Facility: HOSPITAL | Age: 66
End: 2021-05-22

## 2021-05-22 DIAGNOSIS — R92.8 ABNORMAL SCREENING MAMMOGRAM: Primary | ICD-10-CM

## 2021-05-22 DIAGNOSIS — Z12.31 SCREENING MAMMOGRAM, ENCOUNTER FOR: ICD-10-CM

## 2021-05-22 DIAGNOSIS — R92.8 ABNORMAL MAMMOGRAM: ICD-10-CM

## 2021-06-12 ENCOUNTER — TRANSCRIBE ORDERS (OUTPATIENT)
Dept: ADMINISTRATIVE | Facility: HOSPITAL | Age: 66
End: 2021-06-12

## 2021-06-12 DIAGNOSIS — E04.1 THYROID NODULE: Primary | ICD-10-CM

## 2021-07-19 ENCOUNTER — HOSPITAL ENCOUNTER (OUTPATIENT)
Dept: ULTRASOUND IMAGING | Facility: HOSPITAL | Age: 66
Discharge: HOME | End: 2021-07-19
Admitting: FAMILY MEDICINE

## 2021-07-19 DIAGNOSIS — E04.1 THYROID NODULE: ICD-10-CM

## 2021-07-19 PROCEDURE — 76536 US EXAM OF HEAD AND NECK: CPT

## 2021-08-02 ENCOUNTER — HOSPITAL ENCOUNTER (OUTPATIENT)
Dept: MAMMOGRAPHY | Facility: HOSPITAL | Age: 66
Discharge: HOME | End: 2021-08-02

## 2021-08-02 ENCOUNTER — HOSPITAL ENCOUNTER (OUTPATIENT)
Dept: BONE DENSITY | Facility: HOSPITAL | Age: 66
Discharge: HOME | End: 2021-08-02

## 2021-08-02 DIAGNOSIS — R92.8 ABNORMAL SCREENING MAMMOGRAM: ICD-10-CM

## 2021-08-02 DIAGNOSIS — R92.8 ABNORMAL MAMMOGRAM: ICD-10-CM

## 2021-08-02 PROCEDURE — 77065 DX MAMMO INCL CAD UNI: CPT

## 2021-08-02 PROCEDURE — 77080 DXA BONE DENSITY AXIAL: CPT | Performed by: RADIOLOGY

## 2021-08-02 PROCEDURE — 77065 DX MAMMO INCL CAD UNI: CPT | Performed by: RADIOLOGY

## 2021-08-02 PROCEDURE — 77080 DXA BONE DENSITY AXIAL: CPT

## 2021-08-02 PROCEDURE — G0279 TOMOSYNTHESIS, MAMMO: HCPCS

## 2021-08-02 PROCEDURE — 77061 BREAST TOMOSYNTHESIS UNI: CPT | Performed by: RADIOLOGY

## 2021-08-06 ENCOUNTER — TELEPHONE (OUTPATIENT)
Dept: FAMILY MEDICINE CLINIC | Facility: CLINIC | Age: 66
End: 2021-08-06

## 2021-08-06 NOTE — TELEPHONE ENCOUNTER
Caller: Delores Kaur    Relationship to patient: Self    Best call back number: [phone]    Patient is needing:PATIENT CALLED IN AND WOULD LIKE TO HAVE THE RESULTS OF HER TESTS ON 8/2/2021. PLEASE CALL PATIENT AND ADVISE.

## 2021-08-12 ENCOUNTER — TELEPHONE (OUTPATIENT)
Dept: FAMILY MEDICINE CLINIC | Facility: CLINIC | Age: 66
End: 2021-08-12

## 2021-08-12 RX ORDER — SEMAGLUTIDE 1.34 MG/ML
0.53 INJECTION, SOLUTION SUBCUTANEOUS WEEKLY
Qty: 4 PEN | Refills: 1 | Status: SHIPPED | OUTPATIENT
Start: 2021-08-12 | End: 2021-08-17 | Stop reason: SDUPTHER

## 2021-08-12 RX ORDER — SEMAGLUTIDE 1.34 MG/ML
0.4 INJECTION, SOLUTION SUBCUTANEOUS WEEKLY
COMMUNITY
Start: 2021-03-30 | End: 2021-08-12 | Stop reason: SDUPTHER

## 2021-08-12 NOTE — TELEPHONE ENCOUNTER
Patient called and said she is calling for US, bone density and mammogram results.  She also needs a refill on her Ozempic.

## 2021-08-13 DIAGNOSIS — Z12.31 ENCOUNTER FOR SCREENING MAMMOGRAM FOR BREAST CANCER: Primary | ICD-10-CM

## 2021-08-13 RX ORDER — BLOOD SUGAR DIAGNOSTIC
1 STRIP MISCELLANEOUS WEEKLY
Qty: 50 EACH | Refills: 1 | Status: SHIPPED | OUTPATIENT
Start: 2021-08-13 | End: 2021-11-09 | Stop reason: SDUPTHER

## 2021-08-13 NOTE — TELEPHONE ENCOUNTER
----- Message from Vinicio Renee MD sent at 8/4/2021  7:04 PM EDT -----  Please call and let pt know that the left diagnostic mammogram was benign. She can go back to routine screenings. Due in December this year.  Get pt scheduled for bilateral screening mammogram for Dec 2021. Thanks.

## 2021-08-17 RX ORDER — SEMAGLUTIDE 1.34 MG/ML
0.5 INJECTION, SOLUTION SUBCUTANEOUS WEEKLY
Qty: 4 PEN | Refills: 1 | Status: SHIPPED | OUTPATIENT
Start: 2021-08-17 | End: 2021-11-09 | Stop reason: SDUPTHER

## 2021-08-24 ENCOUNTER — TELEPHONE (OUTPATIENT)
Dept: FAMILY MEDICINE CLINIC | Facility: CLINIC | Age: 66
End: 2021-08-24

## 2021-08-24 NOTE — TELEPHONE ENCOUNTER
Patient also stated that she stopped the Ozempic 2 weeks ago due to her insurance and the pharmacy. She okay to stay off it until her appt in November.

## 2021-08-24 NOTE — TELEPHONE ENCOUNTER
Patient requesting DEXA results. I see in the chart that they were done on 8/2/21 same day as her Mammogram. Printing to let Dr Renee to review.

## 2021-08-24 NOTE — TELEPHONE ENCOUNTER
Caller: Delores Kaur    Relationship: Self    Best call back number: [phone]    Caller requesting test results: PATIENT    What test was performed: BONE DENSITY    When was the test performed: 8/2/21    Where was the test performed: [facility]    Additional notes: PATIENT WOULD LIKE TO KNOW HER RESULTS FROM THE BONE DENSITY. ATTEMPTED TO WARM TRANSFER. PLEASE CALL PATIENT TO ADVISE. SHE WOULD LIKE A PHONE CALL TODAY IF POSSIBLE.

## 2021-08-31 ENCOUNTER — TELEPHONE (OUTPATIENT)
Dept: FAMILY MEDICINE CLINIC | Facility: CLINIC | Age: 66
End: 2021-08-31

## 2021-08-31 DIAGNOSIS — Z01.84 IMMUNITY STATUS TESTING: ICD-10-CM

## 2021-08-31 DIAGNOSIS — R94.6 ABNORMAL THYROID FUNCTION TEST: ICD-10-CM

## 2021-08-31 DIAGNOSIS — E78.5 HYPERLIPIDEMIA, UNSPECIFIED HYPERLIPIDEMIA TYPE: Primary | ICD-10-CM

## 2021-08-31 DIAGNOSIS — R53.83 FATIGUE, UNSPECIFIED TYPE: ICD-10-CM

## 2021-08-31 DIAGNOSIS — E55.9 VITAMIN D DEFICIENCY: ICD-10-CM

## 2021-08-31 DIAGNOSIS — R73.09 ELEVATED GLUCOSE: ICD-10-CM

## 2021-10-20 ENCOUNTER — TELEPHONE (OUTPATIENT)
Dept: FAMILY MEDICINE CLINIC | Facility: CLINIC | Age: 66
End: 2021-10-20

## 2021-10-20 DIAGNOSIS — R94.6 ABNORMAL THYROID FUNCTION TEST: ICD-10-CM

## 2021-10-20 DIAGNOSIS — E78.5 HYPERLIPIDEMIA, UNSPECIFIED HYPERLIPIDEMIA TYPE: Primary | ICD-10-CM

## 2021-10-20 DIAGNOSIS — R53.83 FATIGUE, UNSPECIFIED TYPE: ICD-10-CM

## 2021-10-20 DIAGNOSIS — Z01.84 IMMUNITY STATUS TESTING: ICD-10-CM

## 2021-10-20 DIAGNOSIS — E55.9 VITAMIN D DEFICIENCY: ICD-10-CM

## 2021-10-20 DIAGNOSIS — R73.09 ELEVATED GLUCOSE: ICD-10-CM

## 2021-10-20 NOTE — TELEPHONE ENCOUNTER
Caller: Delores Kaur    Relationship: Self    Best call back number: [phone]    What orders are you requesting (i.e. lab or imaging): BLOOD WORK    In what timeframe would the patient need to come in: BEFORE APPOINTMENT ON 11.09.2021    Where will you receive your lab/imaging services: [facility]

## 2021-11-04 ENCOUNTER — LAB (OUTPATIENT)
Dept: LAB | Facility: HOSPITAL | Age: 66
End: 2021-11-04

## 2021-11-04 DIAGNOSIS — Z01.84 IMMUNITY STATUS TESTING: ICD-10-CM

## 2021-11-04 DIAGNOSIS — E78.5 HYPERLIPIDEMIA, UNSPECIFIED HYPERLIPIDEMIA TYPE: ICD-10-CM

## 2021-11-04 DIAGNOSIS — E55.9 VITAMIN D DEFICIENCY: ICD-10-CM

## 2021-11-04 DIAGNOSIS — R94.6 ABNORMAL THYROID FUNCTION TEST: ICD-10-CM

## 2021-11-04 DIAGNOSIS — R53.83 FATIGUE, UNSPECIFIED TYPE: ICD-10-CM

## 2021-11-04 DIAGNOSIS — R73.09 ELEVATED GLUCOSE: ICD-10-CM

## 2021-11-04 LAB
25(OH)D3 SERPL-MCNC: 55.6 NG/ML
ALBUMIN SERPL-MCNC: 4.4 G/DL (ref 3.5–5.2)
ALBUMIN/GLOB SERPL: 1.4 G/DL
ALP SERPL-CCNC: 131 U/L (ref 39–117)
ALT SERPL W P-5'-P-CCNC: 28 U/L (ref 1–33)
ANION GAP SERPL CALCULATED.3IONS-SCNC: 7.6 MMOL/L (ref 5–15)
AST SERPL-CCNC: 21 U/L (ref 1–32)
BASOPHILS # BLD AUTO: 0.04 10*3/MM3 (ref 0–0.2)
BASOPHILS NFR BLD AUTO: 0.6 % (ref 0–1.5)
BILIRUB SERPL-MCNC: 0.7 MG/DL (ref 0–1.2)
BILIRUB UR QL STRIP: NEGATIVE
BUN SERPL-MCNC: 12 MG/DL (ref 8–23)
BUN/CREAT SERPL: 14.3 (ref 7–25)
CALCIUM SPEC-SCNC: 10 MG/DL (ref 8.6–10.5)
CHLORIDE SERPL-SCNC: 106 MMOL/L (ref 98–107)
CHOLEST SERPL-MCNC: 208 MG/DL (ref 0–200)
CLARITY UR: ABNORMAL
CO2 SERPL-SCNC: 27.4 MMOL/L (ref 22–29)
COLOR UR: YELLOW
CREAT SERPL-MCNC: 0.84 MG/DL (ref 0.57–1)
DEPRECATED RDW RBC AUTO: 41.4 FL (ref 37–54)
EOSINOPHIL # BLD AUTO: 0.14 10*3/MM3 (ref 0–0.4)
EOSINOPHIL NFR BLD AUTO: 2.2 % (ref 0.3–6.2)
ERYTHROCYTE [DISTWIDTH] IN BLOOD BY AUTOMATED COUNT: 12.6 % (ref 12.3–15.4)
FOLATE SERPL-MCNC: 9.45 NG/ML (ref 4.78–24.2)
GFR SERPL CREATININE-BSD FRML MDRD: 68 ML/MIN/1.73
GLOBULIN UR ELPH-MCNC: 3.1 GM/DL
GLUCOSE SERPL-MCNC: 111 MG/DL (ref 65–99)
GLUCOSE UR STRIP-MCNC: NEGATIVE MG/DL
HBA1C MFR BLD: 5.8 % (ref 4.8–5.6)
HCT VFR BLD AUTO: 43.7 % (ref 34–46.6)
HDLC SERPL-MCNC: 50 MG/DL (ref 40–60)
HGB BLD-MCNC: 14.4 G/DL (ref 12–15.9)
HGB UR QL STRIP.AUTO: NEGATIVE
IMM GRANULOCYTES # BLD AUTO: 0.01 10*3/MM3 (ref 0–0.05)
IMM GRANULOCYTES NFR BLD AUTO: 0.2 % (ref 0–0.5)
KETONES UR QL STRIP: NEGATIVE
LDLC SERPL CALC-MCNC: 134 MG/DL (ref 0–100)
LDLC/HDLC SERPL: 2.63 {RATIO}
LEUKOCYTE ESTERASE UR QL STRIP.AUTO: ABNORMAL
LYMPHOCYTES # BLD AUTO: 2.61 10*3/MM3 (ref 0.7–3.1)
LYMPHOCYTES NFR BLD AUTO: 41.2 % (ref 19.6–45.3)
MCH RBC QN AUTO: 29.8 PG (ref 26.6–33)
MCHC RBC AUTO-ENTMCNC: 33 G/DL (ref 31.5–35.7)
MCV RBC AUTO: 90.5 FL (ref 79–97)
MONOCYTES # BLD AUTO: 0.51 10*3/MM3 (ref 0.1–0.9)
MONOCYTES NFR BLD AUTO: 8 % (ref 5–12)
NEUTROPHILS NFR BLD AUTO: 3.03 10*3/MM3 (ref 1.7–7)
NEUTROPHILS NFR BLD AUTO: 47.8 % (ref 42.7–76)
NITRITE UR QL STRIP: NEGATIVE
NRBC BLD AUTO-RTO: 0 /100 WBC (ref 0–0.2)
PH UR STRIP.AUTO: 5.5 [PH] (ref 5–8)
PLATELET # BLD AUTO: 340 10*3/MM3 (ref 140–450)
PMV BLD AUTO: 10.5 FL (ref 6–12)
POTASSIUM SERPL-SCNC: 4.3 MMOL/L (ref 3.5–5.2)
PROT SERPL-MCNC: 7.5 G/DL (ref 6–8.5)
PROT UR QL STRIP: NEGATIVE
RBC # BLD AUTO: 4.83 10*6/MM3 (ref 3.77–5.28)
SODIUM SERPL-SCNC: 141 MMOL/L (ref 136–145)
SP GR UR STRIP: 1.02 (ref 1–1.03)
T3FREE SERPL-MCNC: 4.11 PG/ML (ref 2–4.4)
T4 FREE SERPL-MCNC: 1.44 NG/DL (ref 0.93–1.7)
TRIGL SERPL-MCNC: 132 MG/DL (ref 0–150)
TSH SERPL DL<=0.05 MIU/L-ACNC: 0.14 UIU/ML (ref 0.27–4.2)
UROBILINOGEN UR QL STRIP: ABNORMAL
VIT B12 BLD-MCNC: 325 PG/ML (ref 211–946)
VLDLC SERPL-MCNC: 24 MG/DL (ref 5–40)
WBC # BLD AUTO: 6.34 10*3/MM3 (ref 3.4–10.8)

## 2021-11-04 PROCEDURE — 80061 LIPID PANEL: CPT

## 2021-11-04 PROCEDURE — 36415 COLL VENOUS BLD VENIPUNCTURE: CPT

## 2021-11-04 PROCEDURE — 82607 VITAMIN B-12: CPT

## 2021-11-04 PROCEDURE — 82306 VITAMIN D 25 HYDROXY: CPT

## 2021-11-04 PROCEDURE — 83036 HEMOGLOBIN GLYCOSYLATED A1C: CPT

## 2021-11-04 PROCEDURE — 80053 COMPREHEN METABOLIC PANEL: CPT

## 2021-11-04 PROCEDURE — 84439 ASSAY OF FREE THYROXINE: CPT

## 2021-11-04 PROCEDURE — 85025 COMPLETE CBC W/AUTO DIFF WBC: CPT

## 2021-11-04 PROCEDURE — 81001 URINALYSIS AUTO W/SCOPE: CPT | Performed by: FAMILY MEDICINE

## 2021-11-04 PROCEDURE — 86787 VARICELLA-ZOSTER ANTIBODY: CPT

## 2021-11-04 PROCEDURE — 84443 ASSAY THYROID STIM HORMONE: CPT

## 2021-11-04 PROCEDURE — 84481 FREE ASSAY (FT-3): CPT

## 2021-11-04 PROCEDURE — 82746 ASSAY OF FOLIC ACID SERUM: CPT

## 2021-11-05 LAB
BACTERIA UR QL AUTO: ABNORMAL /HPF
HYALINE CASTS UR QL AUTO: ABNORMAL /LPF
RBC # UR: ABNORMAL /HPF
REF LAB TEST METHOD: ABNORMAL
SQUAMOUS #/AREA URNS HPF: ABNORMAL /HPF
WBC UR QL AUTO: ABNORMAL /HPF

## 2021-11-08 LAB
VZV IGG SER IA-ACNC: 898 INDEX
VZV IGM SER IA-ACNC: <0.91 INDEX (ref 0–0.9)

## 2021-11-09 ENCOUNTER — OFFICE VISIT (OUTPATIENT)
Dept: FAMILY MEDICINE CLINIC | Facility: CLINIC | Age: 66
End: 2021-11-09

## 2021-11-09 VITALS
SYSTOLIC BLOOD PRESSURE: 118 MMHG | WEIGHT: 206.7 LBS | DIASTOLIC BLOOD PRESSURE: 70 MMHG | BODY MASS INDEX: 36.62 KG/M2 | OXYGEN SATURATION: 96 % | RESPIRATION RATE: 18 BRPM | TEMPERATURE: 98.2 F | HEART RATE: 72 BPM

## 2021-11-09 DIAGNOSIS — E05.90 HYPERTHYROIDISM: ICD-10-CM

## 2021-11-09 DIAGNOSIS — Z23 NEED FOR INFLUENZA VACCINATION: ICD-10-CM

## 2021-11-09 DIAGNOSIS — E55.9 VITAMIN D DEFICIENCY: ICD-10-CM

## 2021-11-09 DIAGNOSIS — R73.09 ELEVATED GLUCOSE: ICD-10-CM

## 2021-11-09 DIAGNOSIS — R53.83 FATIGUE, UNSPECIFIED TYPE: ICD-10-CM

## 2021-11-09 PROBLEM — E04.1 THYROID NODULE: Status: ACTIVE | Noted: 2021-11-09

## 2021-11-09 PROBLEM — T14.8XXA BROKEN BONES: Status: ACTIVE | Noted: 2021-11-09

## 2021-11-09 PROCEDURE — 90471 IMMUNIZATION ADMIN: CPT | Performed by: FAMILY MEDICINE

## 2021-11-09 PROCEDURE — 99213 OFFICE O/P EST LOW 20 MIN: CPT | Performed by: FAMILY MEDICINE

## 2021-11-09 PROCEDURE — 90686 IIV4 VACC NO PRSV 0.5 ML IM: CPT | Performed by: FAMILY MEDICINE

## 2021-11-09 RX ORDER — LIFITEGRAST 50 MG/ML
1 SOLUTION/ DROPS OPHTHALMIC
COMMUNITY
End: 2022-05-19

## 2021-11-09 RX ORDER — SEMAGLUTIDE 1.34 MG/ML
0.5 INJECTION, SOLUTION SUBCUTANEOUS WEEKLY
Qty: 6 PEN | Refills: 1 | Status: SHIPPED | OUTPATIENT
Start: 2021-11-09 | End: 2022-05-19

## 2021-11-09 RX ORDER — BLOOD SUGAR DIAGNOSTIC
1 STRIP MISCELLANEOUS WEEKLY
Qty: 100 EACH | Refills: 1 | Status: SHIPPED | OUTPATIENT
Start: 2021-11-09 | End: 2022-05-19

## 2021-11-09 NOTE — PROGRESS NOTES
Chief Complaint   Patient presents with   • Hyperlipidemia     6 month follow up    • Vitamin D Deficiency   • Fatigue     Subjective   Delores Kaur is a 66 y.o. female who presents to the office for follow-up.     Hx of elevated glucose.     X of hyperthyroidism. She is not on any thyroid medications. No thyroid surgeries.     The vitamin D is low.            The following portions of the patient's history were reviewed and updated as appropriate: allergies, current medications, past family history, past medical history, past social history, past surgical history and problem list.    Review of Systems   Constitutional: Negative for chills, fever and unexpected weight loss.   Respiratory: Negative for cough, chest tightness and shortness of breath.    Cardiovascular: Negative for chest pain and palpitations.   Gastrointestinal: Negative for abdominal pain, constipation, diarrhea, nausea and vomiting.        Objective   Vitals:    11/09/21 1648   BP: 118/70   BP Location: Left arm   Patient Position: Sitting   Cuff Size: Adult   Pulse: 72   Resp: 18   Temp: 98.2 °F (36.8 °C)   SpO2: 96%   Weight: 93.8 kg (206 lb 11.2 oz)     Body mass index is 36.62 kg/m².  Physical Exam  Vitals reviewed.   Constitutional:       General: She is not in acute distress.     Appearance: Normal appearance. She is not ill-appearing.   HENT:      Head: Normocephalic.   Eyes:      Conjunctiva/sclera: Conjunctivae normal.   Cardiovascular:      Rate and Rhythm: Normal rate and regular rhythm.   Pulmonary:      Effort: Pulmonary effort is normal.      Breath sounds: Normal breath sounds.   Skin:     Findings: No lesion or rash.   Neurological:      Mental Status: She is alert and oriented to person, place, and time.   Psychiatric:         Mood and Affect: Mood normal.          Assessment/Plan   Diagnoses and all orders for this visit:    1. Need for influenza vaccination  -     FluLaval/Fluarix/Fluzone >6 Months (0527-0091)    2. Vitamin D  deficiency    3. Fatigue, unspecified type    4. Elevated glucose    5. Hyperthyroidism  -     Ambulatory Referral to Endocrinology    Other orders  -     Cholecalciferol 1.25 MG (46534 UT) tablet; Take 1.25 mg by mouth 1 (One) Time Per Week.  Dispense: 12 tablet; Refill: 1  -     Semaglutide,0.25 or 0.5MG/DOS, (Ozempic, 0.25 or 0.5 MG/DOSE,) 2 MG/1.5ML solution pen-injector; Inject 0.5 mg under the skin into the appropriate area as directed 1 (One) Time Per Week.  Dispense: 6 pen; Refill: 1  -     Insulin Pen Needle (Pen Needles) 32G X 5 MM misc; 1 each 1 (One) Time Per Week. Use as directed for Ozempic  Dispense: 100 each; Refill: 1           Refer to endocrine for the hyperthyroidism  Get back on ozempic.   Vitamin D.        Return in about 6 months (around 5/9/2022).   PHQ-2/PHQ-9 Depression Screening 11/9/2021   Little interest or pleasure in doing things 0   Feeling down, depressed, or hopeless 0   Total Score 0

## 2021-11-11 ENCOUNTER — TELEPHONE (OUTPATIENT)
Dept: FAMILY MEDICINE CLINIC | Facility: CLINIC | Age: 66
End: 2021-11-11

## 2021-12-10 ENCOUNTER — APPOINTMENT (OUTPATIENT)
Dept: MAMMOGRAPHY | Facility: HOSPITAL | Age: 66
End: 2021-12-10

## 2021-12-16 ENCOUNTER — APPOINTMENT (OUTPATIENT)
Dept: NUCLEAR MEDICINE | Facility: HOSPITAL | Age: 66
End: 2021-12-16

## 2021-12-17 ENCOUNTER — APPOINTMENT (OUTPATIENT)
Dept: NUCLEAR MEDICINE | Facility: HOSPITAL | Age: 66
End: 2021-12-17

## 2022-01-10 ENCOUNTER — HOSPITAL ENCOUNTER (OUTPATIENT)
Dept: NUCLEAR MEDICINE | Facility: HOSPITAL | Age: 67
Discharge: HOME | End: 2022-01-10

## 2022-01-10 DIAGNOSIS — E04.1 THYROID NODULE: ICD-10-CM

## 2022-01-10 PROCEDURE — 78014 THYROID IMAGING W/BLOOD FLOW: CPT

## 2022-01-10 PROCEDURE — 0 SODIUM IODIDE 3.7 MBQ CAPSULE: Performed by: INTERNAL MEDICINE

## 2022-01-10 PROCEDURE — A9516 IODINE I-123 SOD IODIDE MIC: HCPCS | Performed by: INTERNAL MEDICINE

## 2022-01-10 RX ORDER — SODIUM IODIDE I 123 100 UCI/1
1 CAPSULE, GELATIN COATED ORAL
Status: COMPLETED | OUTPATIENT
Start: 2022-01-10 | End: 2022-01-10

## 2022-01-10 RX ADMIN — SODIUM IODIDE I 123 1 CAPSULE: 100 CAPSULE, GELATIN COATED ORAL at 14:19

## 2022-01-11 ENCOUNTER — HOSPITAL ENCOUNTER (OUTPATIENT)
Dept: NUCLEAR MEDICINE | Facility: HOSPITAL | Age: 67
Discharge: HOME | End: 2022-01-11

## 2022-02-14 ENCOUNTER — HOSPITAL ENCOUNTER (OUTPATIENT)
Dept: MAMMOGRAPHY | Facility: HOSPITAL | Age: 67
Discharge: HOME | End: 2022-02-14
Admitting: FAMILY MEDICINE

## 2022-02-14 DIAGNOSIS — R92.8 ABNORMAL MAMMOGRAM: ICD-10-CM

## 2022-02-14 PROCEDURE — 77063 BREAST TOMOSYNTHESIS BI: CPT

## 2022-02-14 PROCEDURE — 77067 SCR MAMMO BI INCL CAD: CPT

## 2022-05-19 ENCOUNTER — OFFICE VISIT (OUTPATIENT)
Dept: FAMILY MEDICINE CLINIC | Facility: CLINIC | Age: 67
End: 2022-05-19

## 2022-05-19 VITALS
HEART RATE: 64 BPM | BODY MASS INDEX: 34.21 KG/M2 | RESPIRATION RATE: 18 BRPM | WEIGHT: 200.4 LBS | DIASTOLIC BLOOD PRESSURE: 49 MMHG | HEIGHT: 64 IN | TEMPERATURE: 98.4 F | OXYGEN SATURATION: 98 % | SYSTOLIC BLOOD PRESSURE: 120 MMHG

## 2022-05-19 DIAGNOSIS — E04.1 THYROID NODULE: ICD-10-CM

## 2022-05-19 DIAGNOSIS — R73.03 PREDIABETES: Chronic | ICD-10-CM

## 2022-05-19 DIAGNOSIS — Z12.11 ENCOUNTER FOR SCREENING FOR MALIGNANT NEOPLASM OF COLON: Primary | ICD-10-CM

## 2022-05-19 DIAGNOSIS — E66.09 CLASS 1 OBESITY DUE TO EXCESS CALORIES WITHOUT SERIOUS COMORBIDITY WITH BODY MASS INDEX (BMI) OF 34.0 TO 34.9 IN ADULT: Chronic | ICD-10-CM

## 2022-05-19 PROBLEM — T14.8XXA BROKEN BONES: Status: RESOLVED | Noted: 2021-11-09 | Resolved: 2022-05-19

## 2022-05-19 PROCEDURE — 99204 OFFICE O/P NEW MOD 45 MIN: CPT | Performed by: FAMILY MEDICINE

## 2022-05-19 RX ORDER — SEMAGLUTIDE 1.34 MG/ML
0.5 INJECTION, SOLUTION SUBCUTANEOUS WEEKLY
Qty: 6 PEN | Refills: 1 | Status: SHIPPED | OUTPATIENT
Start: 2022-05-19

## 2022-05-19 NOTE — PROGRESS NOTES
"Chief Complaint  Establish Care (Wants to discuss elevated A1C treatment options.)    Subjective          Delores Kaur presents to [facility]  She is here today to establish relations as a new patient.  She is .  She has obesity and history of a thyroid nodule.    The patient has no other complaints today and denies chest pain, shortness of breath, weakness, numbness, nausea, vomiting, diarrhea, dizziness or syncopal event.        Objective   Vital Signs:  /49   Pulse 64   Temp 98.4 °F (36.9 °C)   Resp 18   Ht 162.6 cm (64\")   Wt 90.9 kg (200 lb 6.4 oz)   SpO2 98%   BMI 34.40 kg/m²         Physical Exam  Vitals reviewed.   Constitutional:       Appearance: Normal appearance. She is well-developed.   HENT:      Head: Normocephalic and atraumatic.      Right Ear: External ear normal.      Left Ear: External ear normal.      Mouth/Throat:      Pharynx: No oropharyngeal exudate.   Eyes:      Conjunctiva/sclera: Conjunctivae normal.      Pupils: Pupils are equal, round, and reactive to light.   Neck:      Vascular: No carotid bruit.   Cardiovascular:      Rate and Rhythm: Normal rate and regular rhythm.      Heart sounds: No murmur heard.    No friction rub. No gallop.   Pulmonary:      Effort: Pulmonary effort is normal.      Breath sounds: Normal breath sounds. No wheezing or rhonchi.   Abdominal:      General: There is no distension.   Skin:     General: Skin is warm and dry.   Neurological:      Mental Status: She is alert and oriented to person, place, and time.      Cranial Nerves: No cranial nerve deficit.      Motor: No weakness.   Psychiatric:         Mood and Affect: Mood and affect normal.         Behavior: Behavior normal.         Thought Content: Thought content normal.         Judgment: Judgment normal.        Result Review :     CMP    CMP 11/4/21   Glucose 111 (A)   BUN 12   Creatinine 0.84   eGFR Non African Am 68   Sodium 141   Potassium 4.3 "   Chloride 106   Calcium 10.0   Albumin 4.40   Total Bilirubin 0.7   Alkaline Phosphatase 131 (A)   AST (SGOT) 21   ALT (SGPT) 28   (A) Abnormal value            CBC    CBC 11/4/21   WBC 6.34   RBC 4.83   Hemoglobin 14.4   Hematocrit 43.7   MCV 90.5   MCH 29.8   MCHC 33.0   RDW 12.6   Platelets 340           Lipid Panel    Lipid Panel 11/4/21   Total Cholesterol 208 (A)   Triglycerides 132   HDL Cholesterol 50   VLDL Cholesterol 24   LDL Cholesterol  134 (A)   LDL/HDL Ratio 2.63   (A) Abnormal value            TSH    TSH 11/4/21   TSH 0.138 (A)   (A) Abnormal value                      Assessment and Plan    Diagnoses and all orders for this visit:    1. Encounter for screening for malignant neoplasm of colon (Primary)  -     Ambulatory Referral For Screening Colonoscopy    2. Thyroid nodule  -     US Thyroid; Future    3. Class 1 obesity due to excess calories without serious comorbidity with body mass index (BMI) of 34.0 to 34.9 in adult  Assessment & Plan:  Patient's (Body mass index is 34.4 kg/m².) indicates that they are obese (BMI >30) with health conditions that include none . Weight is newly identified. BMI is is above average; BMI management plan is completed. We discussed low calorie, low carb based diet program, portion control and increasing exercise.       4. Prediabetes  Assessment & Plan:  The patient's blood sugars recently have been in the prediabetic range.  We will research her history to determine if she is ever been diabetic in the past.  The patient was prescribed Ozempic today for glycemic control and weight loss.  We will see her back in 6 months.      Other orders  -     Semaglutide,0.25 or 0.5MG/DOS, (Ozempic, 0.25 or 0.5 MG/DOSE,) 2 MG/1.5ML solution pen-injector; Inject 0.5 mg under the skin into the appropriate area as directed 1 (One) Time Per Week.  Dispense: 6 pen; Refill: 1           Follow Up   Return in about 6 months (around 11/19/2022).  Patient was given instructions and  counseling regarding her condition or for health maintenance advice. Please see specific information pulled into the AVS if appropriate.

## 2022-05-23 ENCOUNTER — TELEPHONE (OUTPATIENT)
Dept: FAMILY MEDICINE CLINIC | Facility: CLINIC | Age: 67
End: 2022-05-23

## 2022-05-23 NOTE — TELEPHONE ENCOUNTER
Caller: CARLOS KENNEDY    Relationship: Emergency Contact    Best call back number: [phone]    What is the best time to reach you: ANYTIME    Who are you requesting to speak with (clinical staff, provider,  specific staff member): STAFF    What was the call regarding: PATIENT WAS SEEN IN THE OFFICE ON 5/19 AND WAS CHARGED 20.00 CO PAY.  DID NOT UNDERSTAND WHY.  HUB OFFERED TO GIVE PATIENT'S  BILLING NUMBER TO CHECK WITH THEM, AND PATIENT'S  REFUSED.  STATED HE WOULD COME INTO THE OFFICE TO GET THIS WORKED OUT.

## 2022-05-27 PROBLEM — R73.03 PREDIABETES: Chronic | Status: ACTIVE | Noted: 2022-05-27

## 2022-05-27 PROBLEM — E04.1 THYROID NODULE: Chronic | Status: ACTIVE | Noted: 2021-11-09

## 2022-05-27 PROBLEM — E66.811 CLASS 1 OBESITY DUE TO EXCESS CALORIES WITHOUT SERIOUS COMORBIDITY WITH BODY MASS INDEX (BMI) OF 34.0 TO 34.9 IN ADULT: Chronic | Status: ACTIVE | Noted: 2022-05-27

## 2022-05-27 PROBLEM — R73.03 PREDIABETES: Status: ACTIVE | Noted: 2022-05-27

## 2022-05-27 PROBLEM — E66.09 CLASS 1 OBESITY DUE TO EXCESS CALORIES WITHOUT SERIOUS COMORBIDITY WITH BODY MASS INDEX (BMI) OF 34.0 TO 34.9 IN ADULT: Chronic | Status: ACTIVE | Noted: 2022-05-27

## 2022-05-27 NOTE — ASSESSMENT & PLAN NOTE
The patient's blood sugars recently have been in the prediabetic range.  We will research her history to determine if she is ever been diabetic in the past.  The patient was prescribed Ozempic today for glycemic control and weight loss.  We will see her back in 6 months.

## 2022-05-27 NOTE — ASSESSMENT & PLAN NOTE
Patient's (Body mass index is 34.4 kg/m².) indicates that they are obese (BMI >30) with health conditions that include none . Weight is newly identified. BMI is is above average; BMI management plan is completed. We discussed low calorie, low carb based diet program, portion control and increasing exercise.

## 2022-06-20 ENCOUNTER — HOSPITAL ENCOUNTER (OUTPATIENT)
Dept: ULTRASOUND IMAGING | Facility: HOSPITAL | Age: 67
Discharge: HOME | End: 2022-06-20
Admitting: FAMILY MEDICINE

## 2022-06-20 DIAGNOSIS — E04.1 THYROID NODULE: ICD-10-CM

## 2022-06-20 PROCEDURE — 76536 US EXAM OF HEAD AND NECK: CPT

## 2022-06-22 DIAGNOSIS — E04.1 THYROID NODULE: Primary | ICD-10-CM

## 2022-06-30 ENCOUNTER — PREP FOR SURGERY (OUTPATIENT)
Dept: OTHER | Facility: HOSPITAL | Age: 67
End: 2022-06-30

## 2022-06-30 ENCOUNTER — OFFICE VISIT (OUTPATIENT)
Dept: SURGERY | Facility: CLINIC | Age: 67
End: 2022-06-30

## 2022-06-30 VITALS — HEIGHT: 64 IN | BODY MASS INDEX: 33.46 KG/M2 | WEIGHT: 196 LBS | RESPIRATION RATE: 14 BRPM | HEART RATE: 67 BPM

## 2022-06-30 DIAGNOSIS — K21.9 GERD WITHOUT ESOPHAGITIS: ICD-10-CM

## 2022-06-30 DIAGNOSIS — R13.10 DYSPHAGIA, UNSPECIFIED TYPE: Primary | ICD-10-CM

## 2022-06-30 DIAGNOSIS — Z12.11 SCREENING FOR MALIGNANT NEOPLASM OF COLON: ICD-10-CM

## 2022-06-30 DIAGNOSIS — K21.9 GASTROESOPHAGEAL REFLUX DISEASE WITHOUT ESOPHAGITIS: ICD-10-CM

## 2022-06-30 DIAGNOSIS — R13.10 DYSPHAGIA: Primary | ICD-10-CM

## 2022-06-30 PROCEDURE — 99203 OFFICE O/P NEW LOW 30 MIN: CPT | Performed by: NURSE PRACTITIONER

## 2022-06-30 RX ORDER — LIFITEGRAST 50 MG/ML
1 SOLUTION/ DROPS OPHTHALMIC 2 TIMES DAILY
COMMUNITY

## 2022-06-30 RX ORDER — SODIUM CHLORIDE 0.9 % (FLUSH) 0.9 %
10 SYRINGE (ML) INJECTION AS NEEDED
Status: CANCELLED | OUTPATIENT
Start: 2022-06-30

## 2022-06-30 RX ORDER — SODIUM CHLORIDE 0.9 % (FLUSH) 0.9 %
3 SYRINGE (ML) INJECTION EVERY 12 HOURS SCHEDULED
Status: CANCELLED | OUTPATIENT
Start: 2022-06-30

## 2022-06-30 RX ORDER — OMEGA-3S/DHA/EPA/FISH OIL/D3 300MG-1000
400 CAPSULE ORAL DAILY
COMMUNITY

## 2022-06-30 RX ORDER — POLYETHYLENE GLYCOL 3350 17 G/17G
POWDER, FOR SOLUTION ORAL
Qty: 238 PACKET | Refills: 0 | Status: ON HOLD | OUTPATIENT
Start: 2022-06-30 | End: 2022-10-10

## 2022-06-30 NOTE — PROGRESS NOTES
Chief Complaint: Colonoscopy (consult)    Subjective      EGD & Colonoscopy consultation       History of Present Illness  Delores Kaur is a 67 y.o. female presents to [facility] for an EGD & Colonoscopy consultation.    Patient presents today on referral from Dr. Juana Hudson for an EGD and colonoscopy consultation.  Patient reports that she is with some dysphagia while taking medications and eating food.  She reports it feels like they get stuck in her posterior throat.    Patient does report some heartburn and indigestion.  Uses OTC Tums to control.    Denies any epigastric pain.    Denies any lower abdominal pain, change in bowel habit, rectal bleeding.    Denies any family history of colorectal cancer.    : Colonoscopy ([facility]): Normal colon    Objective     Past Medical History:   Diagnosis Date   • Broken bones     Right humerous   • Hypothyroidism    • Thyroid nodule        Past Surgical History:   Procedure Laterality Date   •  SECTION     • HYSTERECTOMY     • THYROID SURGERY     • TONSILLECTOMY         Outpatient Medications Marked as Taking for the 22 encounter (Office Visit) with Jacqueline Vincent APRN   Medication Sig Dispense Refill   • cholecalciferol (VITAMIN D3) 10 MCG (400 UNIT) tablet Take 400 Units by mouth Daily.     • Lifitegrast (Xiidra) 5 % ophthalmic solution Administer 1 drop to both eyes 2 (Two) Times a Day.     • Semaglutide,0.25 or 0.5MG/DOS, (Ozempic, 0.25 or 0.5 MG/DOSE,) 2 MG/1.5ML solution pen-injector Inject 0.5 mg under the skin into the appropriate area as directed 1 (One) Time Per Week. 6 pen 1       No Known Allergies     Family History   Problem Relation Age of Onset   • Other Mother    • Cancer Father         Oral Cancer   • Breast cancer Other         Aunt       Social History     Socioeconomic History   • Marital status:    Tobacco Use   • Smoking status: Former Smoker     Packs/day: 1.50     Types:  "Cigarettes     Quit date:      Years since quittin.5   • Smokeless tobacco: Never Used   Vaping Use   • Vaping Use: Never used   Substance and Sexual Activity   • Alcohol use: Never   • Drug use: Never   • Sexual activity: Never       Review of Systems   Constitutional: Negative for chills and fever.   HENT: Positive for trouble swallowing.    Gastrointestinal: Positive for GERD and indigestion. Negative for abdominal distention, abdominal pain, anal bleeding, blood in stool, constipation, diarrhea, nausea, rectal pain and vomiting.        Vital Signs:   Pulse 67   Resp 14   Ht 162.6 cm (64\")   Wt 88.9 kg (196 lb)   BMI 33.64 kg/m²      Physical Exam  Constitutional:       Appearance: Normal appearance.   HENT:      Head: Normocephalic.   Cardiovascular:      Rate and Rhythm: Normal rate.   Pulmonary:      Effort: Pulmonary effort is normal.   Abdominal:      General: Abdomen is flat.      Palpations: Abdomen is soft.   Skin:     General: Skin is warm and dry.   Neurological:      General: No focal deficit present.      Mental Status: She is alert and oriented to person, place, and time.   Psychiatric:         Mood and Affect: Mood normal.         Thought Content: Thought content normal.          Result Review :          []  Laboratory  []  Radiology  []  Pathology  []  Microbiology  []  EKG/Telemetry   []  Cardiology/Vascular   [x]  Old records  Today I reviewed Dr. Camacho's previous colonoscopy.     Assessment and Plan    Diagnoses and all orders for this visit:    1. Dysphagia, unspecified type (Primary)    2. Gastroesophageal reflux disease without esophagitis    3. Screening for malignant neoplasm of colon    Other orders  -     polyethylene glycol (MIRALAX) 17 g packet; Take as directed.  Instructions given in office.  Dispense: 238 g bottle  Dispense: 238 packet; Refill: 0    white prep    Follow Up   Return for Scheduled EGD and colonoscopy with Dr. Camacho on 10/10/2022 at [facility] " Hospital.     Hospital arrival time: 0930,    Possible risks/complications, benefits, and alternatives to surgical or invasive procedure have been explained to patient and/or legal guardian.     Patient has been evaluated and can tolerate anesthesia and/or sedation. Risks, benefits, and alternatives to anesthesia and sedation have been explained to patient and/or legal guardian.  Patient verbalizes understanding is will proceed with above plan.    Patient was given instructions and counseling regarding her condition or for health maintenance advice. Please see specific information pulled into the AVS if appropriate.     * EMR dragon/transcription disclaimer: Much of this encounter note is an electronic transcription/translation of spoken language to printed text. Electronic translation of spoken language may permit erroneous, or at times nonsensical words or phrases to be inadvertently transcribed; although I have reviewed the note for such errors, some may still exist.

## 2022-08-10 ENCOUNTER — TELEPHONE (OUTPATIENT)
Dept: FAMILY MEDICINE CLINIC | Facility: CLINIC | Age: 67
End: 2022-08-10

## 2022-08-10 RX ORDER — SEMAGLUTIDE 1.34 MG/ML
0.5 INJECTION, SOLUTION SUBCUTANEOUS WEEKLY
Qty: 6 PEN | Refills: 1 | Status: CANCELLED | OUTPATIENT
Start: 2022-08-10

## 2022-08-10 NOTE — TELEPHONE ENCOUNTER
Spoke with pharmacy, patient has another 3 month supply available at pharmacy. Called patient to let her know and if she had any issues to give us a call back

## 2022-08-10 NOTE — TELEPHONE ENCOUNTER
Caller: Delores Kaur    Relationship: Self    Best call back number: [phone]    Requested Prescriptions:   Requested Prescriptions     Pending Prescriptions Disp Refills   • Semaglutide,0.25 or 0.5MG/DOS, (Ozempic, 0.25 or 0.5 MG/DOSE,) 2 MG/1.5ML solution pen-injector 6 pen 1     Sig: Inject 0.5 mg under the skin into the appropriate area as directed 1 (One) Time Per Week.        Pharmacy where request should be sent: [facility]Y #[id number] - [place], [place] - [address] [facility] - [phone]  - [phone] FX     Additional details provided by patient: TOOK LAST INJECTION Monday     Does the patient have less than a 3 day supply:  [x] Yes  [] No    Les Sanchez   08/10/22 14:02 EDT

## 2022-10-06 NOTE — PRE-PROCEDURE INSTRUCTIONS
PT INSTRUCTED ON CLEAR LIQ DIET AND PO SPLIT PREP LAST BM SHOULD BE CLEAR   NO MEDS NOTED  ARRIVAL TIME IS  0930 AM  ON 10/10/22

## 2022-10-10 ENCOUNTER — ANESTHESIA EVENT (OUTPATIENT)
Dept: GASTROENTEROLOGY | Facility: HOSPITAL | Age: 67
End: 2022-10-10

## 2022-10-10 ENCOUNTER — HOSPITAL ENCOUNTER (OUTPATIENT)
Facility: HOSPITAL | Age: 67
Setting detail: HOSPITAL OUTPATIENT SURGERY
Discharge: HOME | End: 2022-10-10
Attending: SURGERY | Admitting: SURGERY

## 2022-10-10 ENCOUNTER — ANESTHESIA (OUTPATIENT)
Dept: GASTROENTEROLOGY | Facility: HOSPITAL | Age: 67
End: 2022-10-10

## 2022-10-10 VITALS
SYSTOLIC BLOOD PRESSURE: 119 MMHG | BODY MASS INDEX: 33.45 KG/M2 | RESPIRATION RATE: 18 BRPM | HEART RATE: 70 BPM | WEIGHT: 194.89 LBS | TEMPERATURE: 97.1 F | DIASTOLIC BLOOD PRESSURE: 75 MMHG | OXYGEN SATURATION: 97 %

## 2022-10-10 DIAGNOSIS — K21.9 GERD WITHOUT ESOPHAGITIS: ICD-10-CM

## 2022-10-10 DIAGNOSIS — Z12.11 SCREENING FOR MALIGNANT NEOPLASM OF COLON: ICD-10-CM

## 2022-10-10 DIAGNOSIS — R13.10 DYSPHAGIA: ICD-10-CM

## 2022-10-10 LAB — GLUCOSE BLDC GLUCOMTR-MCNC: 109 MG/DL (ref 70–99)

## 2022-10-10 PROCEDURE — 88305 TISSUE EXAM BY PATHOLOGIST: CPT | Performed by: SURGERY

## 2022-10-10 PROCEDURE — 25010000002 PROPOFOL 10 MG/ML EMULSION: Performed by: NURSE ANESTHETIST, CERTIFIED REGISTERED

## 2022-10-10 PROCEDURE — 82962 GLUCOSE BLOOD TEST: CPT

## 2022-10-10 RX ORDER — SODIUM CHLORIDE 0.9 % (FLUSH) 0.9 %
3 SYRINGE (ML) INJECTION EVERY 12 HOURS SCHEDULED
Status: DISCONTINUED | OUTPATIENT
Start: 2022-10-10 | End: 2022-10-10 | Stop reason: HOSPADM

## 2022-10-10 RX ORDER — ONDANSETRON 2 MG/ML
4 INJECTION INTRAMUSCULAR; INTRAVENOUS ONCE AS NEEDED
Status: DISCONTINUED | OUTPATIENT
Start: 2022-10-10 | End: 2022-10-10 | Stop reason: HOSPADM

## 2022-10-10 RX ORDER — LIDOCAINE HYDROCHLORIDE 20 MG/ML
INJECTION, SOLUTION EPIDURAL; INFILTRATION; INTRACAUDAL; PERINEURAL AS NEEDED
Status: DISCONTINUED | OUTPATIENT
Start: 2022-10-10 | End: 2022-10-10 | Stop reason: SURG

## 2022-10-10 RX ORDER — SODIUM CHLORIDE, SODIUM LACTATE, POTASSIUM CHLORIDE, CALCIUM CHLORIDE 600; 310; 30; 20 MG/100ML; MG/100ML; MG/100ML; MG/100ML
30 INJECTION, SOLUTION INTRAVENOUS CONTINUOUS
Status: DISCONTINUED | OUTPATIENT
Start: 2022-10-10 | End: 2022-10-10 | Stop reason: HOSPADM

## 2022-10-10 RX ORDER — SODIUM CHLORIDE 0.9 % (FLUSH) 0.9 %
10 SYRINGE (ML) INJECTION AS NEEDED
Status: DISCONTINUED | OUTPATIENT
Start: 2022-10-10 | End: 2022-10-10 | Stop reason: HOSPADM

## 2022-10-10 RX ORDER — PROPOFOL 10 MG/ML
VIAL (ML) INTRAVENOUS AS NEEDED
Status: DISCONTINUED | OUTPATIENT
Start: 2022-10-10 | End: 2022-10-10 | Stop reason: SURG

## 2022-10-10 RX ORDER — ONDANSETRON 4 MG/1
4 TABLET, FILM COATED ORAL ONCE AS NEEDED
Status: DISCONTINUED | OUTPATIENT
Start: 2022-10-10 | End: 2022-10-10 | Stop reason: HOSPADM

## 2022-10-10 RX ADMIN — SODIUM CHLORIDE, POTASSIUM CHLORIDE, SODIUM LACTATE AND CALCIUM CHLORIDE 30 ML/HR: 600; 310; 30; 20 INJECTION, SOLUTION INTRAVENOUS at 10:48

## 2022-10-10 RX ADMIN — PROPOFOL 100 MG: 10 INJECTION, EMULSION INTRAVENOUS at 11:20

## 2022-10-10 RX ADMIN — PROPOFOL 175 MCG/KG/MIN: 10 INJECTION, EMULSION INTRAVENOUS at 11:20

## 2022-10-10 RX ADMIN — LIDOCAINE HYDROCHLORIDE 50 MG: 20 INJECTION, SOLUTION EPIDURAL; INFILTRATION; INTRACAUDAL; PERINEURAL at 11:20

## 2022-10-10 NOTE — ANESTHESIA POSTPROCEDURE EVALUATION
Patient: Delores Kaur    Procedure Summary     Date: 10/10/22 Room / Location: [facility] ENDOSCOPY 3 / [facility] ENDOSCOPY    Anesthesia Start: 1117 Anesthesia Stop: 1144    Procedures:       COLONOSCOPY      ESOPHAGOGASTRODUODENOSCOPY WITH BXS Diagnosis:       Dysphagia      GERD without esophagitis      Screening for malignant neoplasm of colon      (Dysphagia [R13.10])      (GERD without esophagitis [K21.9])      (Screening for malignant neoplasm of colon [Z12.11])    Surgeons: James Camacho MD Provider: Bert Layne MD    Anesthesia Type: general ASA Status: 1          Anesthesia Type: general    Vitals  Vitals Value Taken Time   /61 10/10/22 1149   Temp 36.1 °C (97 °F) 10/10/22 1144   Pulse 69 10/10/22 1152   Resp 16 10/10/22 1148   SpO2 98 % 10/10/22 1152   Vitals shown include unvalidated device data.        Post Anesthesia Care and Evaluation    Patient location during evaluation: bedside  Patient participation: complete - patient participated  Level of consciousness: awake  Pain management: adequate    Airway patency: patent  Anesthetic complications: No anesthetic complications  PONV Status: none  Cardiovascular status: acceptable and stable  Respiratory status: acceptable  Hydration status: acceptable    Comments: An Anesthesiologist personally participated in the most demanding procedures (including induction and emergence if applicable) in the anesthesia plan, monitored the course of anesthesia administration at frequent intervals and remained physically present and available for immediate diagnosis and treatment of emergencies.

## 2022-10-10 NOTE — H&P
[facility]   HISTORY AND PHYSICAL    Patient Name: Delores Kaur  : 1955  MRN: [id number]  Primary Care Physician:  Juana Hudson DO  Date of admission: 10/10/2022    Subjective   Subjective     Chief Complaint: Dysphagia, colon screening    HPI:    Delores Kaur is a 67 y.o. female who presents for evaluation of dysphagia and GERD.  She also presents for colon screening.    Review of Systems   Respiratory: Negative for shortness of breath.    Cardiovascular: Negative for chest pain.   Gastrointestinal: Negative for blood in stool.       Personal History     Past Medical History:   Diagnosis Date   • Broken bones     Right humerous   • Diabetes mellitus (HCC)    • Hypothyroidism    • Thyroid nodule        Past Surgical History:   Procedure Laterality Date   • BREAST SURGERY Left     BIOPSY   •  SECTION     • COLONOSCOPY     • HYSTERECTOMY     • TONSILLECTOMY         Family History: family history includes Breast cancer in an other family member; Cancer in her father; Other in her mother. Otherwise pertinent FHx was reviewed and not pertinent to current issue.    Social History:  reports that she quit smoking about 22 years ago. Her smoking use included cigarettes. She smoked an average of 1.5 packs per day. She has never used smokeless tobacco. She reports that she does not drink alcohol and does not use drugs.    Home Medications:  Lifitegrast, Semaglutide(0.25 or 0.5MG/DOS), cholecalciferol, and polyethylene glycol    Allergies:  No Known Allergies    Objective    Objective     Vitals:        Physical Exam  Constitutional:       Appearance: Normal appearance.   HENT:      Head: Normocephalic.   Cardiovascular:      Rate and Rhythm: Normal rate.   Pulmonary:      Effort: Pulmonary effort is normal.   Abdominal:      Palpations: Abdomen is soft.   Musculoskeletal:         General: Normal range of motion.      Cervical back: Normal range of motion.   Skin:     General: Skin is warm.    Neurological:      General: No focal deficit present.      Mental Status: She is alert.   Psychiatric:         Mood and Affect: Mood normal.         Result Review    Result Review:  I have personally reviewed the results from the time of this admission to 10/10/2022 10:13 EDT and agree with these findings:  []  Laboratory  []  Microbiology  []  Radiology  []  EKG/Telemetry   []  Cardiology/Vascular   []  Pathology  []  Old records  []  Other:  Most notable findings include:     Assessment & Plan   Assessment / Plan     Brief Patient Summary:  Delores Kaur is a 67 y.o. female who presents for evaluation of dysphagia and GERD as well as colon screening.    Active Hospital Problems:  Active Hospital Problems    Diagnosis    • Dysphagia      Added automatically from request for surgery [id number]     • GERD without esophagitis      Added automatically from request for surgery [id number]     • Screening for malignant neoplasm of colon      Added automatically from request for surgery [id number]         Plan:   We will proceed with a colonoscopy.  We will also perform an EGD.  Risk benefits and alternatives were explained.    DVT prophylaxis:  No DVT prophylaxis order currently exists.    CODE STATUS:         Admission Status:  I believe this patient meets outpatient status.    Electronically signed by James Camacho MD, 10/10/22, 10:13 AM EDT.

## 2022-10-10 NOTE — ANESTHESIA PREPROCEDURE EVALUATION
Anesthesia Evaluation     Patient summary reviewed and Nursing notes reviewed   no history of anesthetic complications:  NPO Solid Status: > 8 hours  NPO Liquid Status: > 2 hours           Airway   Mallampati: II  TM distance: >3 FB  Neck ROM: full  No difficulty expected  Dental      Pulmonary - negative pulmonary ROS and normal exam    breath sounds clear to auscultation  Cardiovascular - negative cardio ROS and normal exam  Exercise tolerance: good (4-7 METS)    Rhythm: regular  Rate: normal        Neuro/Psych- negative ROS  GI/Hepatic/Renal/Endo    (+)  GERD,  diabetes mellitus type 2, thyroid problem hypothyroidism    Musculoskeletal (-) negative ROS    Abdominal    Substance History - negative use     OB/GYN negative ob/gyn ROS         Other - negative ROS       ROS/Med Hx Other: PAT Nursing Notes unavailable.                   Anesthesia Plan    ASA 1     general     (Total IV Anesthesia    Patient understands anesthesia not responsible for dental damage.  )  intravenous induction     Anesthetic plan, risks, benefits, and alternatives have been provided, discussed and informed consent has been obtained with: patient.    Plan discussed with CRNA.        CODE STATUS:

## 2022-10-11 LAB
CYTO UR: NORMAL
LAB AP CASE REPORT: NORMAL
LAB AP CLINICAL INFORMATION: NORMAL
PATH REPORT.FINAL DX SPEC: NORMAL
PATH REPORT.GROSS SPEC: NORMAL

## 2022-11-21 ENCOUNTER — OFFICE VISIT (OUTPATIENT)
Dept: FAMILY MEDICINE CLINIC | Facility: CLINIC | Age: 67
End: 2022-11-21

## 2022-11-21 VITALS
HEART RATE: 61 BPM | WEIGHT: 193.8 LBS | OXYGEN SATURATION: 97 % | BODY MASS INDEX: 33.09 KG/M2 | RESPIRATION RATE: 18 BRPM | DIASTOLIC BLOOD PRESSURE: 61 MMHG | HEIGHT: 64 IN | SYSTOLIC BLOOD PRESSURE: 113 MMHG | TEMPERATURE: 97.5 F

## 2022-11-21 DIAGNOSIS — E55.9 VITAMIN D DEFICIENCY: Chronic | ICD-10-CM

## 2022-11-21 DIAGNOSIS — Z00.00 ANNUAL PHYSICAL EXAM: ICD-10-CM

## 2022-11-21 DIAGNOSIS — R73.03 PREDIABETES: Primary | Chronic | ICD-10-CM

## 2022-11-21 DIAGNOSIS — E78.2 MIXED HYPERLIPIDEMIA: Chronic | ICD-10-CM

## 2022-11-21 DIAGNOSIS — E66.09 CLASS 1 OBESITY DUE TO EXCESS CALORIES WITHOUT SERIOUS COMORBIDITY WITH BODY MASS INDEX (BMI) OF 33.0 TO 33.9 IN ADULT: Chronic | ICD-10-CM

## 2022-11-21 PROBLEM — E66.811 CLASS 1 OBESITY DUE TO EXCESS CALORIES WITHOUT SERIOUS COMORBIDITY WITH BODY MASS INDEX (BMI) OF 33.0 TO 33.9 IN ADULT: Status: ACTIVE | Noted: 2022-05-27

## 2022-11-21 PROBLEM — E66.811 CLASS 1 OBESITY DUE TO EXCESS CALORIES WITHOUT SERIOUS COMORBIDITY WITH BODY MASS INDEX (BMI) OF 34.0 TO 34.9 IN ADULT: Chronic | Status: RESOLVED | Noted: 2022-05-27 | Resolved: 2022-11-21

## 2022-11-21 PROCEDURE — 99214 OFFICE O/P EST MOD 30 MIN: CPT | Performed by: FAMILY MEDICINE

## 2022-11-21 NOTE — ASSESSMENT & PLAN NOTE
Patient's (Body mass index is 33.27 kg/m².) indicates that they are obese (BMI >30) with health conditions that include none . Weight is improving with treatment. BMI is is above average; BMI management plan is completed. We discussed low calorie, low carb based diet program, portion control and increasing exercise.

## 2022-11-21 NOTE — ASSESSMENT & PLAN NOTE
Lipid abnormalities are newly identified.  Nutritional counseling was provided.  Lipids will be reassessed in 6 months.    The 10-year ASCVD risk score (Sandra AGUILA, et al., 2019) is: 4.7%    Values used to calculate the score:      Age: 67 years      Sex: Female      Is Non- : No      Diabetic: No      Tobacco smoker: No      Systolic Blood Pressure: 113 mmHg      Is BP treated: No      HDL Cholesterol: 50 mg/dL      Total Cholesterol: 208 mg/dL

## 2022-11-21 NOTE — PROGRESS NOTES
"Chief Complaint  Follow-up and Labs Only    Subjective        Delores Kaur presents to [facility]  History of Present Illness  She is here today for management of her chronic medical conditions.  She is .  She has obesity and history of a thyroid nodule.    She is dieting and trying lose weight.  She is down 7 pounds in the past 6-month since her last visit.     The patient has no other complaints today and denies chest pain, shortness of breath, weakness, numbness, nausea, vomiting, diarrhea, dizziness or syncopal event.      Objective   Vital Signs:  /61 (BP Location: Left arm, Patient Position: Sitting)   Pulse 61   Temp 97.5 °F (36.4 °C)   Resp 18   Ht 162.6 cm (64\")   Wt 87.9 kg (193 lb 12.8 oz)   SpO2 97%   BMI 33.27 kg/m²   Estimated body mass index is 33.27 kg/m² as calculated from the following:    Height as of this encounter: 162.6 cm (64\").    Weight as of this encounter: 87.9 kg (193 lb 12.8 oz).          Physical Exam  Vitals reviewed.   Constitutional:       Appearance: Normal appearance. She is well-developed. She is obese.   HENT:      Head: Normocephalic and atraumatic.      Right Ear: External ear normal.      Left Ear: External ear normal.      Mouth/Throat:      Pharynx: No oropharyngeal exudate.   Eyes:      Conjunctiva/sclera: Conjunctivae normal.      Pupils: Pupils are equal, round, and reactive to light.   Neck:      Vascular: No carotid bruit.   Cardiovascular:      Rate and Rhythm: Normal rate and regular rhythm.      Heart sounds: No murmur heard.    No friction rub. No gallop.   Pulmonary:      Effort: Pulmonary effort is normal.      Breath sounds: Normal breath sounds. No wheezing or rhonchi.   Abdominal:      General: There is no distension.   Skin:     General: Skin is warm and dry.   Neurological:      Mental Status: She is alert and oriented to person, place, and time.      Cranial Nerves: No cranial nerve deficit.      Motor: " No weakness.   Psychiatric:         Mood and Affect: Mood and affect normal.         Behavior: Behavior normal.         Thought Content: Thought content normal.         Judgment: Judgment normal.        Result Review :                Assessment and Plan   Diagnoses and all orders for this visit:    1. Prediabetes (Primary)  Comments:  The patient's all 7 pound since her last visit.  She is on Ozempic.  She was encouraged to continue dieting and lose weight.  A1c ordered today.  Orders:  -     Hemoglobin A1c; Future    2. Class 1 obesity due to excess calories without serious comorbidity with body mass index (BMI) of 33.0 to 33.9 in adult  Assessment & Plan:  Patient's (Body mass index is 33.27 kg/m².) indicates that they are obese (BMI >30) with health conditions that include none . Weight is improving with treatment. BMI is is above average; BMI management plan is completed. We discussed low calorie, low carb based diet program, portion control and increasing exercise.       3. Vitamin D deficiency  Assessment & Plan:  The patient currently taking vitamin D replacement daily.  She was given lab order today for vitamin D level to be checked remains on 5.    Orders:  -     Vitamin D 25 hydroxy; Future    4. Mixed hyperlipidemia  Assessment & Plan:  Lipid abnormalities are newly identified.  Nutritional counseling was provided.  Lipids will be reassessed in 6 months.    The 10-year ASCVD risk score (Sandra DK, et al., 2019) is: 4.7%    Values used to calculate the score:      Age: 67 years      Sex: Female      Is Non- : No      Diabetic: No      Tobacco smoker: No      Systolic Blood Pressure: 113 mmHg      Is BP treated: No      HDL Cholesterol: 50 mg/dL      Total Cholesterol: 208 mg/dL      Orders:  -     Lipid panel; Future    5. Annual physical exam  -     CBC w AUTO Differential; Future  -     Comprehensive metabolic panel; Future  -     TSH+Free T4; Future           Follow Up   No  follow-ups on file.  Patient was given instructions and counseling regarding her condition or for health maintenance advice. Please see specific information pulled into the AVS if appropriate.

## 2022-11-21 NOTE — ASSESSMENT & PLAN NOTE
The patient currently taking vitamin D replacement daily.  She was given lab order today for vitamin D level to be checked remains on 5.

## 2022-11-22 ENCOUNTER — LAB (OUTPATIENT)
Dept: LAB | Facility: HOSPITAL | Age: 67
End: 2022-11-22

## 2022-11-22 DIAGNOSIS — E78.2 MIXED HYPERLIPIDEMIA: Chronic | ICD-10-CM

## 2022-11-22 DIAGNOSIS — Z00.00 ANNUAL PHYSICAL EXAM: ICD-10-CM

## 2022-11-22 DIAGNOSIS — R73.03 PREDIABETES: Chronic | ICD-10-CM

## 2022-11-22 DIAGNOSIS — E55.9 VITAMIN D DEFICIENCY: Chronic | ICD-10-CM

## 2022-11-22 LAB
25(OH)D3 SERPL-MCNC: 41.7 NG/ML (ref 30–100)
BASOPHILS # BLD AUTO: 0.04 10*3/MM3 (ref 0–0.2)
BASOPHILS NFR BLD AUTO: 0.6 % (ref 0–1.5)
DEPRECATED RDW RBC AUTO: 38.1 FL (ref 37–54)
EOSINOPHIL # BLD AUTO: 0.1 10*3/MM3 (ref 0–0.4)
EOSINOPHIL NFR BLD AUTO: 1.5 % (ref 0.3–6.2)
ERYTHROCYTE [DISTWIDTH] IN BLOOD BY AUTOMATED COUNT: 12.1 % (ref 12.3–15.4)
HBA1C MFR BLD: 5.4 % (ref 4.8–5.6)
HCT VFR BLD AUTO: 39.4 % (ref 34–46.6)
HGB BLD-MCNC: 13.6 G/DL (ref 12–15.9)
IMM GRANULOCYTES # BLD AUTO: 0.02 10*3/MM3 (ref 0–0.05)
IMM GRANULOCYTES NFR BLD AUTO: 0.3 % (ref 0–0.5)
LYMPHOCYTES # BLD AUTO: 2.46 10*3/MM3 (ref 0.7–3.1)
LYMPHOCYTES NFR BLD AUTO: 37.3 % (ref 19.6–45.3)
MCH RBC QN AUTO: 29.8 PG (ref 26.6–33)
MCHC RBC AUTO-ENTMCNC: 34.5 G/DL (ref 31.5–35.7)
MCV RBC AUTO: 86.2 FL (ref 79–97)
MONOCYTES # BLD AUTO: 0.47 10*3/MM3 (ref 0.1–0.9)
MONOCYTES NFR BLD AUTO: 7.1 % (ref 5–12)
NEUTROPHILS NFR BLD AUTO: 3.51 10*3/MM3 (ref 1.7–7)
NEUTROPHILS NFR BLD AUTO: 53.2 % (ref 42.7–76)
NRBC BLD AUTO-RTO: 0 /100 WBC (ref 0–0.2)
PLATELET # BLD AUTO: 319 10*3/MM3 (ref 140–450)
PMV BLD AUTO: 10.3 FL (ref 6–12)
RBC # BLD AUTO: 4.57 10*6/MM3 (ref 3.77–5.28)
T4 FREE SERPL-MCNC: 1.21 NG/DL (ref 0.93–1.7)
TSH SERPL DL<=0.05 MIU/L-ACNC: 0.2 UIU/ML (ref 0.27–4.2)
WBC NRBC COR # BLD: 6.6 10*3/MM3 (ref 3.4–10.8)

## 2022-11-22 PROCEDURE — 82306 VITAMIN D 25 HYDROXY: CPT

## 2022-11-22 PROCEDURE — 80061 LIPID PANEL: CPT

## 2022-11-22 PROCEDURE — 36415 COLL VENOUS BLD VENIPUNCTURE: CPT

## 2022-11-22 PROCEDURE — 80053 COMPREHEN METABOLIC PANEL: CPT

## 2022-11-22 PROCEDURE — 84443 ASSAY THYROID STIM HORMONE: CPT

## 2022-11-22 PROCEDURE — 85025 COMPLETE CBC W/AUTO DIFF WBC: CPT

## 2022-11-22 PROCEDURE — 84439 ASSAY OF FREE THYROXINE: CPT

## 2022-11-22 PROCEDURE — 83036 HEMOGLOBIN GLYCOSYLATED A1C: CPT

## 2022-11-23 LAB
ALBUMIN SERPL-MCNC: 4.2 G/DL (ref 3.5–5.2)
ALBUMIN/GLOB SERPL: 1.3 G/DL
ALP SERPL-CCNC: 127 U/L (ref 39–117)
ALT SERPL W P-5'-P-CCNC: 16 U/L (ref 1–33)
ANION GAP SERPL CALCULATED.3IONS-SCNC: 10.1 MMOL/L (ref 5–15)
AST SERPL-CCNC: 23 U/L (ref 1–32)
BILIRUB SERPL-MCNC: 0.6 MG/DL (ref 0–1.2)
BUN SERPL-MCNC: 13 MG/DL (ref 8–23)
BUN/CREAT SERPL: 15.1 (ref 7–25)
CALCIUM SPEC-SCNC: 9.6 MG/DL (ref 8.6–10.5)
CHLORIDE SERPL-SCNC: 104 MMOL/L (ref 98–107)
CHOLEST SERPL-MCNC: 202 MG/DL (ref 0–200)
CO2 SERPL-SCNC: 25.9 MMOL/L (ref 22–29)
CREAT SERPL-MCNC: 0.86 MG/DL (ref 0.57–1)
EGFRCR SERPLBLD CKD-EPI 2021: 75.1 ML/MIN/1.73
GLOBULIN UR ELPH-MCNC: 3.2 GM/DL
GLUCOSE SERPL-MCNC: 88 MG/DL (ref 65–99)
HDLC SERPL-MCNC: 51 MG/DL (ref 40–60)
LDLC SERPL CALC-MCNC: 127 MG/DL (ref 0–100)
LDLC/HDLC SERPL: 2.43 {RATIO}
POTASSIUM SERPL-SCNC: 3.8 MMOL/L (ref 3.5–5.2)
PROT SERPL-MCNC: 7.4 G/DL (ref 6–8.5)
SODIUM SERPL-SCNC: 140 MMOL/L (ref 136–145)
TRIGL SERPL-MCNC: 135 MG/DL (ref 0–150)
VLDLC SERPL-MCNC: 24 MG/DL (ref 5–40)

## 2023-04-10 RX ORDER — SEMAGLUTIDE 1.34 MG/ML
0.5 INJECTION, SOLUTION SUBCUTANEOUS WEEKLY
Qty: 1.5 ML | Refills: 2 | Status: SHIPPED | OUTPATIENT
Start: 2023-04-10

## 2023-07-31 RX ORDER — SEMAGLUTIDE 1.34 MG/ML
0.5 INJECTION, SOLUTION SUBCUTANEOUS WEEKLY
Qty: 1.5 ML | Refills: 2 | Status: SHIPPED | OUTPATIENT
Start: 2023-07-31

## 2023-08-23 ENCOUNTER — TELEPHONE (OUTPATIENT)
Dept: FAMILY MEDICINE CLINIC | Facility: CLINIC | Age: 68
End: 2023-08-23
Payer: MEDICARE

## 2023-08-23 DIAGNOSIS — E78.2 MIXED HYPERLIPIDEMIA: Primary | Chronic | ICD-10-CM

## 2023-08-23 DIAGNOSIS — E04.1 THYROID NODULE: Chronic | ICD-10-CM

## 2023-08-23 DIAGNOSIS — R73.03 PREDIABETES: Chronic | ICD-10-CM

## 2023-08-23 DIAGNOSIS — Z00.00 ANNUAL PHYSICAL EXAM: ICD-10-CM

## 2023-08-23 DIAGNOSIS — E55.9 VITAMIN D DEFICIENCY: Chronic | ICD-10-CM

## 2023-08-23 NOTE — TELEPHONE ENCOUNTER
"    Caller: Delores Kaur \"Karishma\"    Relationship: Self    Best call back number: [phone]    What orders are you requesting (i.e. lab or imaging): A1C AND OTHER LABS FOR APPT 08/25/2023     In what timeframe would the patient need to come in: TOMORROW    Where will you receive your lab/imaging services: [facility]    Additional notes: PLEASE PUT ORDERS IN THE SYSTEM ASAP. PATIENT WOULD LIKE TO HAVE HER LABS AND A1C LAB DONE THE DAY BEFORE SINCE THESE ARE FASTING LABS.   PLEASE CALL PATIENT WHEN ORDERS ARE PLACED SO SHE CAN COME IN TOMORROW EARLY.      "

## 2023-08-23 NOTE — TELEPHONE ENCOUNTER
Patient asking for labs prior to 8/25/23 appt.  Pended some for you.  She wants to come tomorrow morning for these.

## 2023-08-24 ENCOUNTER — LAB (OUTPATIENT)
Dept: LAB | Facility: HOSPITAL | Age: 68
End: 2023-08-24
Payer: MEDICARE

## 2023-08-24 DIAGNOSIS — E55.9 VITAMIN D DEFICIENCY: Chronic | ICD-10-CM

## 2023-08-24 DIAGNOSIS — E04.1 THYROID NODULE: Chronic | ICD-10-CM

## 2023-08-24 DIAGNOSIS — E78.2 MIXED HYPERLIPIDEMIA: Chronic | ICD-10-CM

## 2023-08-24 DIAGNOSIS — R73.03 PREDIABETES: Chronic | ICD-10-CM

## 2023-08-24 DIAGNOSIS — Z00.00 ANNUAL PHYSICAL EXAM: ICD-10-CM

## 2023-08-24 LAB
25(OH)D3 SERPL-MCNC: 32.7 NG/ML (ref 30–100)
ALBUMIN SERPL-MCNC: 4.1 G/DL (ref 3.5–5.2)
ALBUMIN/GLOB SERPL: 1.3 G/DL
ALP SERPL-CCNC: 117 U/L (ref 39–117)
ALT SERPL W P-5'-P-CCNC: 26 U/L (ref 1–33)
ANION GAP SERPL CALCULATED.3IONS-SCNC: 9.7 MMOL/L (ref 5–15)
AST SERPL-CCNC: 26 U/L (ref 1–32)
BASOPHILS # BLD AUTO: 0.03 10*3/MM3 (ref 0–0.2)
BASOPHILS NFR BLD AUTO: 0.6 % (ref 0–1.5)
BILIRUB SERPL-MCNC: 0.7 MG/DL (ref 0–1.2)
BUN SERPL-MCNC: 10 MG/DL (ref 8–23)
BUN/CREAT SERPL: 14.1 (ref 7–25)
CALCIUM SPEC-SCNC: 9.4 MG/DL (ref 8.6–10.5)
CHLORIDE SERPL-SCNC: 106 MMOL/L (ref 98–107)
CHOLEST SERPL-MCNC: 188 MG/DL (ref 0–200)
CO2 SERPL-SCNC: 23.3 MMOL/L (ref 22–29)
CREAT SERPL-MCNC: 0.71 MG/DL (ref 0.57–1)
DEPRECATED RDW RBC AUTO: 40.9 FL (ref 37–54)
EGFRCR SERPLBLD CKD-EPI 2021: 93.9 ML/MIN/1.73
EOSINOPHIL # BLD AUTO: 0.14 10*3/MM3 (ref 0–0.4)
EOSINOPHIL NFR BLD AUTO: 2.7 % (ref 0.3–6.2)
ERYTHROCYTE [DISTWIDTH] IN BLOOD BY AUTOMATED COUNT: 12.6 % (ref 12.3–15.4)
GLOBULIN UR ELPH-MCNC: 3.2 GM/DL
GLUCOSE SERPL-MCNC: 101 MG/DL (ref 65–99)
HCT VFR BLD AUTO: 40.7 % (ref 34–46.6)
HDLC SERPL-MCNC: 49 MG/DL (ref 40–60)
HGB BLD-MCNC: 13.7 G/DL (ref 12–15.9)
IMM GRANULOCYTES # BLD AUTO: 0.01 10*3/MM3 (ref 0–0.05)
IMM GRANULOCYTES NFR BLD AUTO: 0.2 % (ref 0–0.5)
LDLC SERPL CALC-MCNC: 120 MG/DL (ref 0–100)
LDLC/HDLC SERPL: 2.4 {RATIO}
LYMPHOCYTES # BLD AUTO: 2.03 10*3/MM3 (ref 0.7–3.1)
LYMPHOCYTES NFR BLD AUTO: 38.9 % (ref 19.6–45.3)
MCH RBC QN AUTO: 30.1 PG (ref 26.6–33)
MCHC RBC AUTO-ENTMCNC: 33.7 G/DL (ref 31.5–35.7)
MCV RBC AUTO: 89.5 FL (ref 79–97)
MONOCYTES # BLD AUTO: 0.35 10*3/MM3 (ref 0.1–0.9)
MONOCYTES NFR BLD AUTO: 6.7 % (ref 5–12)
NEUTROPHILS NFR BLD AUTO: 2.66 10*3/MM3 (ref 1.7–7)
NEUTROPHILS NFR BLD AUTO: 50.9 % (ref 42.7–76)
NRBC BLD AUTO-RTO: 0 /100 WBC (ref 0–0.2)
PLATELET # BLD AUTO: 313 10*3/MM3 (ref 140–450)
PMV BLD AUTO: 10.2 FL (ref 6–12)
POTASSIUM SERPL-SCNC: 4 MMOL/L (ref 3.5–5.2)
PROT SERPL-MCNC: 7.3 G/DL (ref 6–8.5)
RBC # BLD AUTO: 4.55 10*6/MM3 (ref 3.77–5.28)
SODIUM SERPL-SCNC: 139 MMOL/L (ref 136–145)
T4 FREE SERPL-MCNC: 1.13 NG/DL (ref 0.93–1.7)
TRIGL SERPL-MCNC: 107 MG/DL (ref 0–150)
TSH SERPL DL<=0.05 MIU/L-ACNC: 0.34 UIU/ML (ref 0.27–4.2)
VLDLC SERPL-MCNC: 19 MG/DL (ref 5–40)
WBC NRBC COR # BLD: 5.22 10*3/MM3 (ref 3.4–10.8)

## 2023-08-24 PROCEDURE — 80053 COMPREHEN METABOLIC PANEL: CPT

## 2023-08-24 PROCEDURE — 36415 COLL VENOUS BLD VENIPUNCTURE: CPT

## 2023-08-24 PROCEDURE — 85025 COMPLETE CBC W/AUTO DIFF WBC: CPT

## 2023-08-24 PROCEDURE — 84443 ASSAY THYROID STIM HORMONE: CPT

## 2023-08-24 PROCEDURE — 84439 ASSAY OF FREE THYROXINE: CPT

## 2023-08-24 PROCEDURE — 83036 HEMOGLOBIN GLYCOSYLATED A1C: CPT

## 2023-08-24 PROCEDURE — 80061 LIPID PANEL: CPT

## 2023-08-24 PROCEDURE — 82306 VITAMIN D 25 HYDROXY: CPT

## 2023-08-25 ENCOUNTER — OFFICE VISIT (OUTPATIENT)
Dept: FAMILY MEDICINE CLINIC | Facility: CLINIC | Age: 68
End: 2023-08-25
Payer: MEDICARE

## 2023-08-25 VITALS
HEART RATE: 71 BPM | OXYGEN SATURATION: 95 % | DIASTOLIC BLOOD PRESSURE: 60 MMHG | WEIGHT: 195 LBS | BODY MASS INDEX: 33.29 KG/M2 | TEMPERATURE: 97.7 F | SYSTOLIC BLOOD PRESSURE: 116 MMHG | HEIGHT: 64 IN

## 2023-08-25 DIAGNOSIS — R73.03 PREDIABETES: Primary | Chronic | ICD-10-CM

## 2023-08-25 DIAGNOSIS — E66.09 CLASS 1 OBESITY DUE TO EXCESS CALORIES WITHOUT SERIOUS COMORBIDITY WITH BODY MASS INDEX (BMI) OF 33.0 TO 33.9 IN ADULT: Chronic | ICD-10-CM

## 2023-08-25 DIAGNOSIS — E78.2 MIXED HYPERLIPIDEMIA: Chronic | ICD-10-CM

## 2023-08-25 DIAGNOSIS — E55.9 VITAMIN D DEFICIENCY: Chronic | ICD-10-CM

## 2023-08-25 DIAGNOSIS — Z12.31 ENCOUNTER FOR SCREENING MAMMOGRAM FOR MALIGNANT NEOPLASM OF BREAST: ICD-10-CM

## 2023-08-25 LAB — HBA1C MFR BLD: 5.8 % (ref 4.8–5.6)

## 2023-08-25 PROCEDURE — 1160F RVW MEDS BY RX/DR IN RCRD: CPT | Performed by: FAMILY MEDICINE

## 2023-08-25 PROCEDURE — 99214 OFFICE O/P EST MOD 30 MIN: CPT | Performed by: FAMILY MEDICINE

## 2023-08-25 PROCEDURE — 1159F MED LIST DOCD IN RCRD: CPT | Performed by: FAMILY MEDICINE

## 2023-08-25 RX ORDER — SEMAGLUTIDE 1.34 MG/ML
0.5 INJECTION, SOLUTION SUBCUTANEOUS WEEKLY
Qty: 4.5 ML | Refills: 1 | Status: SHIPPED | OUTPATIENT
Start: 2023-08-25

## 2023-08-25 NOTE — ASSESSMENT & PLAN NOTE
Her vit D is trending downward. She is going to increase her vit D intake and recheck in 6 months.

## 2023-08-25 NOTE — ASSESSMENT & PLAN NOTE
Lipid abnormalities are improving with lifestyle modifications.  Nutritional counseling was provided.  Lipids will be reassessed in 6 months.    The 10-year ASCVD risk score (Sandra AGUILA, et al., 2019) is: 5.2%    Values used to calculate the score:      Age: 68 years      Sex: Female      Is Non- : No      Diabetic: No      Tobacco smoker: No      Systolic Blood Pressure: 116 mmHg      Is BP treated: No      HDL Cholesterol: 49 mg/dL      Total Cholesterol: 188 mg/dL

## 2023-08-25 NOTE — ASSESSMENT & PLAN NOTE
Patient's (Body mass index is 33.47 kg/mý.) indicates that they are obese (BMI >30) with health conditions that include hypertension and diabetes mellitus . Weight is worsening. BMI  is above average; BMI management plan is completed. We discussed low calorie, low carb based diet program, portion control, and increasing exercise.

## 2023-08-25 NOTE — PROGRESS NOTES
"Chief Complaint  Prediabetes, Hyperlipidemia, and Obesity    Subjective        Delores Kaur presents to [facility]  History of Present Illness  She is here today for management of her chronic medical conditions.  She is .  She has obesity and history of a thyroid nodule.     She is dieting and trying to lose weight.  She has gained 2 pounds in the past 6-month since her last visit.     The patient has no other complaints today and denies chest pain, shortness of breath, weakness, numbness, nausea, vomiting, diarrhea, dizziness or syncopal event.    Diabetes  She has type 2 diabetes mellitus. The initial diagnosis of diabetes was made 4 years ago. Hypoglycemia symptoms include sleepiness. Associated symptoms include fatigue and visual change. Pertinent negatives for hypoglycemia complications include no blackouts, no hospitalization, no nocturnal hypoglycemia, no required assistance and no required glucagon injection. Pertinent negatives for diabetic complications include no CVA, heart disease, impotence, nephropathy, peripheral neuropathy, PVD or retinopathy. Risk factors for coronary artery disease include obesity. Current diabetic treatment includes oral agent (monotherapy). She is compliant with treatment all of the time. Her weight is fluctuating minimally. She is following a generally unhealthy diet. When asked about meal planning, she reported none. She has not had a previous visit with a dietitian. She participates in exercise intermittently. There is no compliance with monitoring of blood glucose. She does not see a podiatrist.Eye exam is current.   Hyperlipidemia  Exacerbating diseases include obesity.   Obesity  Associated symptoms include fatigue and a visual change.     Objective   Vital Signs:  /60   Pulse 71   Temp 97.7 øF (36.5 øC)   Ht 162.6 cm (64\")   Wt 88.5 kg (195 lb)   SpO2 95%   BMI 33.47 kg/mý   Estimated body mass index is 33.47 kg/mý as " "calculated from the following:    Height as of this encounter: 162.6 cm (64\").    Weight as of this encounter: 88.5 kg (195 lb).             Physical Exam  Vitals reviewed.   Constitutional:       Appearance: Normal appearance. She is well-developed. She is obese.   HENT:      Head: Normocephalic and atraumatic.      Right Ear: External ear normal.      Left Ear: External ear normal.      Mouth/Throat:      Pharynx: No oropharyngeal exudate.   Eyes:      Conjunctiva/sclera: Conjunctivae normal.      Pupils: Pupils are equal, round, and reactive to light.   Neck:      Vascular: No carotid bruit.   Cardiovascular:      Rate and Rhythm: Normal rate and regular rhythm.      Heart sounds: No murmur heard.    No friction rub. No gallop.   Pulmonary:      Effort: Pulmonary effort is normal.      Breath sounds: Normal breath sounds. No wheezing or rhonchi.   Abdominal:      General: There is no distension.   Skin:     General: Skin is warm and dry.   Neurological:      Mental Status: She is alert and oriented to person, place, and time.      Cranial Nerves: No cranial nerve deficit.      Motor: No weakness.   Psychiatric:         Mood and Affect: Mood and affect normal.         Behavior: Behavior normal.         Thought Content: Thought content normal.         Judgment: Judgment normal.      Result Review :    CMP          11/22/2022    11:18 8/24/2023    11:48   CMP   Glucose 88  101    BUN 13  10    Creatinine 0.86  0.71    EGFR 75.1  93.9    Sodium 140  139    Potassium 3.8  4.0    Chloride 104  106    Calcium 9.6  9.4    Total Protein 7.4  7.3    Albumin 4.20  4.1    Globulin 3.2  3.2    Total Bilirubin 0.6  0.7    Alkaline Phosphatase 127  117    AST (SGOT) 23  26    ALT (SGPT) 16  26    Albumin/Globulin Ratio 1.3  1.3    BUN/Creatinine Ratio 15.1  14.1    Anion Gap 10.1  9.7      CBC          11/22/2022    11:18 8/24/2023    11:48   CBC   WBC 6.60  5.22    RBC 4.57  4.55    Hemoglobin 13.6  13.7    Hematocrit 39.4  " 40.7    MCV 86.2  89.5    MCH 29.8  30.1    MCHC 34.5  33.7    RDW 12.1  12.6    Platelets 319  313      Lipid Panel          11/22/2022    11:18 8/24/2023    11:48   Lipid Panel   Total Cholesterol 202  188    Triglycerides 135  107    HDL Cholesterol 51  49    VLDL Cholesterol 24  19    LDL Cholesterol  127  120    LDL/HDL Ratio 2.43  2.40      TSH          11/22/2022    11:18 8/24/2023    11:48   TSH   TSH 0.198  0.340                   Assessment and Plan   Diagnoses and all orders for this visit:    1. Prediabetes (Primary)  Assessment & Plan:  She is on Ozempic and she was encouraged to lose weight. A1C pending.   -     Semaglutide,0.25 or 0.5MG/DOS, (Ozempic, 0.25 or 0.5 MG/DOSE,) 2 MG/1.5ML solution pen-injector; Inject 0.5 mg under the skin into the appropriate area as directed 1 (One) Time Per Week.  Dispense: 4.5 mL; Refill: 1    2. Class 1 obesity due to excess calories without serious comorbidity with body mass index (BMI) of 33.0 to 33.9 in adult  Assessment & Plan:  Patient's (Body mass index is 33.47 kg/mý.) indicates that they are obese (BMI >30) with health conditions that include hypertension and diabetes mellitus . Weight is worsening. BMI  is above average; BMI management plan is completed. We discussed low calorie, low carb based diet program, portion control, and increasing exercise.       3. Mixed hyperlipidemia  Assessment & Plan:  Lipid abnormalities are improving with lifestyle modifications.  Nutritional counseling was provided.  Lipids will be reassessed in 6 months.    The 10-year ASCVD risk score (Sandra AGUILA, et al., 2019) is: 5.2%    Values used to calculate the score:      Age: 68 years      Sex: Female      Is Non- : No      Diabetic: No      Tobacco smoker: No      Systolic Blood Pressure: 116 mmHg      Is BP treated: No      HDL Cholesterol: 49 mg/dL      Total Cholesterol: 188 mg/dL    4. Encounter for screening mammogram for malignant neoplasm of breast  -      Mammo Screening Digital Tomosynthesis Bilateral With CAD; Future    5. Vitamin D deficiency  Assessment & Plan:  Her vit D is trending downward. She is going to increase her vit D intake and recheck in 6 months.            Follow Up   Return in about 3 months (around 11/25/2023) for Medicare Wellness.  Patient was given instructions and counseling regarding her condition or for health maintenance advice. Please see specific information pulled into the AVS if appropriate.       Answers submitted by the patient for this visit:  Primary Reason for Visit (Submitted on 8/23/2023)  What is the primary reason for your visit?: Diabetes

## 2023-11-07 ENCOUNTER — HOSPITAL ENCOUNTER (OUTPATIENT)
Dept: MAMMOGRAPHY | Facility: HOSPITAL | Age: 68
Discharge: HOME | End: 2023-11-07
Admitting: FAMILY MEDICINE
Payer: MEDICARE

## 2023-11-07 DIAGNOSIS — Z12.31 ENCOUNTER FOR SCREENING MAMMOGRAM FOR MALIGNANT NEOPLASM OF BREAST: ICD-10-CM

## 2023-11-07 PROCEDURE — 77067 SCR MAMMO BI INCL CAD: CPT

## 2023-11-07 PROCEDURE — 77063 BREAST TOMOSYNTHESIS BI: CPT

## 2023-11-08 DIAGNOSIS — R92.8 ABNORMAL MAMMOGRAM: Primary | ICD-10-CM

## 2023-11-30 ENCOUNTER — HOSPITAL ENCOUNTER (OUTPATIENT)
Dept: MAMMOGRAPHY | Facility: HOSPITAL | Age: 68
Discharge: HOME | End: 2023-11-30
Payer: MEDICARE

## 2023-11-30 ENCOUNTER — HOSPITAL ENCOUNTER (OUTPATIENT)
Dept: ULTRASOUND IMAGING | Facility: HOSPITAL | Age: 68
Discharge: HOME | End: 2023-11-30
Payer: MEDICARE

## 2023-11-30 DIAGNOSIS — R92.8 ABNORMAL MAMMOGRAM: ICD-10-CM

## 2023-11-30 PROCEDURE — 77065 DX MAMMO INCL CAD UNI: CPT

## 2023-11-30 PROCEDURE — 76642 ULTRASOUND BREAST LIMITED: CPT

## 2023-11-30 PROCEDURE — G0279 TOMOSYNTHESIS, MAMMO: HCPCS

## 2023-12-06 ENCOUNTER — PATIENT OUTREACH (OUTPATIENT)
Dept: ONCOLOGY | Facility: HOSPITAL | Age: 68
End: 2023-12-06
Payer: MEDICARE

## 2023-12-06 ENCOUNTER — HOSPITAL ENCOUNTER (OUTPATIENT)
Dept: MAMMOGRAPHY | Facility: HOSPITAL | Age: 68
Discharge: HOME | End: 2023-12-06
Payer: MEDICARE

## 2023-12-06 DIAGNOSIS — N63.13 MASS OF LOWER OUTER QUADRANT OF RIGHT BREAST: ICD-10-CM

## 2023-12-06 PROCEDURE — 25010000002 LIDOCAINE 1 % SOLUTION: Performed by: FAMILY MEDICINE

## 2023-12-06 PROCEDURE — 88342 IMHCHEM/IMCYTCHM 1ST ANTB: CPT | Performed by: FAMILY MEDICINE

## 2023-12-06 PROCEDURE — 88341 IMHCHEM/IMCYTCHM EA ADD ANTB: CPT | Performed by: FAMILY MEDICINE

## 2023-12-06 PROCEDURE — 88360 TUMOR IMMUNOHISTOCHEM/MANUAL: CPT | Performed by: FAMILY MEDICINE

## 2023-12-06 PROCEDURE — 88305 TISSUE EXAM BY PATHOLOGIST: CPT | Performed by: FAMILY MEDICINE

## 2023-12-06 PROCEDURE — A4648 IMPLANTABLE TISSUE MARKER: HCPCS

## 2023-12-06 RX ORDER — LIDOCAINE HYDROCHLORIDE AND EPINEPHRINE 10; 10 MG/ML; UG/ML
10 INJECTION, SOLUTION INFILTRATION; PERINEURAL ONCE
Status: COMPLETED | OUTPATIENT
Start: 2023-12-06 | End: 2023-12-06

## 2023-12-06 RX ORDER — LIDOCAINE HYDROCHLORIDE 10 MG/ML
20 INJECTION, SOLUTION INFILTRATION; PERINEURAL ONCE
Status: COMPLETED | OUTPATIENT
Start: 2023-12-06 | End: 2023-12-06

## 2023-12-06 RX ADMIN — LIDOCAINE HYDROCHLORIDE 20 ML: 10 INJECTION, SOLUTION INFILTRATION; PERINEURAL at 09:52

## 2023-12-06 RX ADMIN — LIDOCAINE HYDROCHLORIDE,EPINEPHRINE BITARTRATE 10 ML: 10; .01 INJECTION, SOLUTION INFILTRATION; PERINEURAL at 09:53

## 2023-12-07 ENCOUNTER — TELEPHONE (OUTPATIENT)
Dept: FAMILY MEDICINE CLINIC | Facility: CLINIC | Age: 68
End: 2023-12-07
Payer: MEDICARE

## 2023-12-08 LAB
CYTO UR: NORMAL
LAB AP CASE REPORT: NORMAL
LAB AP CLINICAL INFORMATION: NORMAL
LAB AP SPECIAL STAINS: NORMAL
LAB AP SYNOPTIC CHECKLIST: NORMAL
PATH REPORT.FINAL DX SPEC: NORMAL
PATH REPORT.GROSS SPEC: NORMAL

## 2023-12-11 ENCOUNTER — TELEPHONE (OUTPATIENT)
Dept: FAMILY MEDICINE CLINIC | Facility: CLINIC | Age: 68
End: 2023-12-11
Payer: MEDICARE

## 2023-12-11 DIAGNOSIS — D05.11 DUCTAL CARCINOMA IN SITU (DCIS) OF RIGHT BREAST: Primary | ICD-10-CM

## 2023-12-12 ENCOUNTER — PATIENT OUTREACH (OUTPATIENT)
Dept: ONCOLOGY | Facility: HOSPITAL | Age: 68
End: 2023-12-12
Payer: MEDICARE

## 2023-12-13 LAB
CYTO UR: NORMAL
LAB AP CASE REPORT: NORMAL
LAB AP CLINICAL INFORMATION: NORMAL
LAB AP SPECIAL STAINS: NORMAL
LAB AP SYNOPTIC CHECKLIST: NORMAL
PATH REPORT.ADDENDUM SPEC: NORMAL
PATH REPORT.FINAL DX SPEC: NORMAL
PATH REPORT.GROSS SPEC: NORMAL

## 2023-12-18 ENCOUNTER — PATIENT OUTREACH (OUTPATIENT)
Dept: ONCOLOGY | Facility: HOSPITAL | Age: 68
End: 2023-12-18
Payer: MEDICARE

## 2023-12-21 ENCOUNTER — HOSPITAL ENCOUNTER (OUTPATIENT)
Dept: OTHER | Facility: HOSPITAL | Age: 68
Discharge: HOME | End: 2023-12-21

## 2024-01-26 ENCOUNTER — HOSPITAL ENCOUNTER (OUTPATIENT)
Dept: OTHER | Facility: HOSPITAL | Age: 69
Discharge: HOME | End: 2024-01-26

## 2024-01-29 ENCOUNTER — HOSPITAL ENCOUNTER (OUTPATIENT)
Dept: OTHER | Facility: HOSPITAL | Age: 69
Discharge: HOME | End: 2024-01-29

## 2024-03-08 ENCOUNTER — HOSPITAL ENCOUNTER (OUTPATIENT)
Dept: RADIATION ONCOLOGY | Facility: HOSPITAL | Age: 69
Setting detail: RADIATION/ONCOLOGY SERIES
End: 2024-03-08
Payer: MEDICARE

## 2024-03-08 ENCOUNTER — CONSULT (OUTPATIENT)
Dept: RADIATION ONCOLOGY | Facility: HOSPITAL | Age: 69
End: 2024-03-08
Payer: MEDICARE

## 2024-03-08 VITALS
RESPIRATION RATE: 18 BRPM | SYSTOLIC BLOOD PRESSURE: 109 MMHG | TEMPERATURE: 98.1 F | BODY MASS INDEX: 33.57 KG/M2 | HEART RATE: 66 BPM | DIASTOLIC BLOOD PRESSURE: 59 MMHG | OXYGEN SATURATION: 92 % | WEIGHT: 195.55 LBS

## 2024-03-08 DIAGNOSIS — C50.111 MALIGNANT NEOPLASM OF CENTRAL PORTION OF RIGHT BREAST IN FEMALE, ESTROGEN RECEPTOR POSITIVE: Primary | ICD-10-CM

## 2024-03-08 DIAGNOSIS — Z17.0 MALIGNANT NEOPLASM OF CENTRAL PORTION OF RIGHT BREAST IN FEMALE, ESTROGEN RECEPTOR POSITIVE: Primary | ICD-10-CM

## 2024-03-08 NOTE — PROGRESS NOTES
New Patient Office Visit      Encounter Date: 03/08/2024   Patient Name: Delores Kaur  YOB: 1955   Medical Record Number: [id number]   Primary Diagnosis: Malignant neoplasm of central portion of right breast in female, estrogen receptor positive [C50.111, Z17.0]       Chief Complaint:    Chief Complaint   Patient presents with    Consult    Breast Cancer       History of Present Illness: Delores Kaur 68-year-old female with early-stage invasive ductal carcinoma of the right breast status postlumpectomy and sentinel lymph node biopsy who is seen in consultation regarding radiotherapy as a component of breast conserving therapy.  Ms. Kaur underwent screening mammography on 11/7/2023 revealing a focal asymmetry in the central right breast, middle third depth.  Diagnostic mammogram and ultrasound performed on 11/30/2023 revealed a 1.2 cm irregular mass in the lower central right breast, middle third depth.  Ultrasound performed the same day revealed a 1.1 x 1.1 cm irregular solid mass at the 6 o'clock position, 3 cm from the nipple with no axillary lymphadenopathy.  Pathology from biopsy of this lesion performed on 12/6/2023 revealed invasive ductal carcinoma, grade 1, ER positive/AL positive, HER2 negative with Ki-67 of 10%.  There was associated low-grade DCIS.  On 1/29/2024, Mrs. Kaur underwent lumpectomy and sentinel of node biopsy per Dr. Andersen at [facility] with pathology revealing invasive ductal carcinoma, grade 1 measuring 13 mm in greatest dimension, ER positive/AL positive, HER2/renuka negative with Ki-67 of 10%.  All margins were negative for invasive carcinoma with the closest margin being 2 mm.  The closest margin from DCIS was 1 mm laterally.  A total of 4 sentinel lymph nodes were removed and all were negative for invasive carcinoma.  Ms. Kaur reports feeling well overall after surgery with no complaints.    Subjective        Review of Systems: Review of Systems    Constitutional:  Negative for appetite change, chills, fatigue, fever and unexpected weight change.   HENT:  Negative for hearing loss, sore throat and trouble swallowing.    Eyes:  Negative for visual disturbance.   Respiratory:  Negative for cough, chest tightness and shortness of breath.    Cardiovascular:  Negative for chest pain and palpitations.   Gastrointestinal:  Negative for abdominal pain, blood in stool, constipation, diarrhea, nausea and vomiting.   Genitourinary:  Negative for difficulty urinating, dysuria, frequency, hematuria and urgency.   Musculoskeletal:  Negative for arthralgias, back pain, gait problem and myalgias.   Skin:  Negative for color change, rash and wound.   Neurological:  Negative for dizziness, weakness, light-headedness and headaches.   Psychiatric/Behavioral:  Positive for sleep disturbance (wakes frequently). Negative for self-injury and suicidal ideas.        Past Medical History:   Past Medical History:   Diagnosis Date    Broken bones     Right humerous    Cataract     Diabetes mellitus     Hypothyroidism     Obesity 1998    Osteopenia 2010    Thyroid nodule     Visual impairment contacts       Past Surgical History:   Past Surgical History:   Procedure Laterality Date    BREAST SURGERY Left     BIOPSY    CATARACT EXTRACTION, BILATERAL       SECTION      COLONOSCOPY      COLONOSCOPY N/A 10/10/2022    Procedure: COLONOSCOPY;  Surgeon: James Camacho MD;  Location: [facility];  Service: General;  Laterality: N/A;  NORMAL COLONOSCOPY    ENDOSCOPY N/A 10/10/2022    Procedure: ESOPHAGOGASTRODUODENOSCOPY WITH BXS;  Surgeon: James Camacho MD;  Location: [facility];  Service: General;  Laterality: N/A;  ESOPHAGITIS    EYE SURGERY      retina sx    HYSTERECTOMY      TONSILLECTOMY         Family History:   Family History   Problem Relation Age of Onset    Cancer Mother         Myelodysplastic    Other Mother     Cancer Father         oral  cancer of soft pallet    Breast cancer Maternal Aunt     Breast cancer Other         Aunt    Malig Hyperthermia Neg Hx        Social History:   Social History     Socioeconomic History    Marital status:    Tobacco Use    Smoking status: Former     Current packs/day: 0.00     Average packs/day: 1.5 packs/day for 23.3 years (35.0 ttl pk-yrs)     Types: Cigarettes     Start date: 1976     Quit date: 2000     Years since quittin.2    Smokeless tobacco: Never    Tobacco comments:     smoked for about 20 years, quit since about    Vaping Use    Vaping status: Never Used   Substance and Sexual Activity    Alcohol use: Never    Drug use: Never    Sexual activity: Yes     Partners: Male     Comment: hysterectomy       Medications:     Current Outpatient Medications:     Semaglutide,0.25 or 0.5MG/DOS, (Ozempic, 0.25 or 0.5 MG/DOSE,) 2 MG/1.5ML solution pen-injector, Inject 0.5 mg under the skin into the appropriate area as directed 1 (One) Time Per Week., Disp: 4.5 mL, Rfl: 1    tretinoin (RETIN-A) 0.025 % cream, APPLY A PEA-SIZED AMOUNT TO FACE AT BEDTIME, Disp: , Rfl:     BIOTIN PO, Take 500 mg by mouth Daily. (Patient not taking: Reported on 3/8/2024), Disp: , Rfl:     Calcium Carbonate (CALCIUM 500 PO), Take 500 mg by mouth Daily. (Patient not taking: Reported on 3/8/2024), Disp: , Rfl:     cholecalciferol (VITAMIN D3) 10 MCG (400 UNIT) tablet, Take 1 tablet by mouth Daily. (Patient not taking: Reported on 3/8/2024), Disp: , Rfl:     FIBER PO, Take  by mouth Daily. (Patient not taking: Reported on 3/8/2024), Disp: , Rfl:     Allergies:   No Known Allergies    Pain: (on a scale of 0-10)   Pain Score    24 1444   PainSc: 0-No pain       Delores Kaur reports a pain score of 0.  Given her pain assessment as noted, treatment options were discussed and the following options were decided upon as a follow-up plan to address the patient's pain: continuation of current treatment plan for pain.      Advanced Care Plan: N   Quality of Life: 100 - Full Activity    Objective     Physical Exam:   Vital Signs:   Vitals:    03/08/24 1444   BP: 109/59   Pulse: 66   Resp: 18   Temp: 98.1 °F (36.7 °C)   TempSrc: Temporal   SpO2: 92%   Weight: 88.7 kg (195 lb 8.8 oz)   PainSc: 0-No pain     Body mass index is 33.57 kg/m².     Physical Exam  Constitutional:       General: She is not in acute distress.     Appearance: She is normal weight. She is not toxic-appearing.   HENT:      Head: Normocephalic and atraumatic.   Pulmonary:      Effort: Pulmonary effort is normal. No respiratory distress.   Chest:       Skin:     General: Skin is warm and dry.      Coloration: Skin is not jaundiced.   Neurological:      General: No focal deficit present.      Mental Status: She is alert and oriented to person, place, and time.      Cranial Nerves: No cranial nerve deficit.      Gait: Gait normal.   Psychiatric:         Mood and Affect: Mood normal.         Behavior: Behavior normal.         Judgment: Judgment normal.         Results:   Radiographs: I personally reviewed the mammogram performed on 11/30/2023.  The pertinent findings are as above in HPI.    Pathology: I personally reviewed the pathology reports from the procedures performed on 12/6/2023 and 1/29/2024.  The pertinent findings are as above in HPI.      Labs:   WBC   Date Value Ref Range Status   11/10/2023 6.61 4.5 - 11.0 10*3/uL Final     Hemoglobin   Date Value Ref Range Status   11/10/2023 13.7 12.0 - 16.0 g/dL Final     Hematocrit   Date Value Ref Range Status   11/10/2023 40.6 36.0 - 46.0 % Final     Platelets   Date Value Ref Range Status   11/10/2023 339 140 - 440 10*3/uL Final       Assessment / Plan      Assessment/Plan:   Delores Kaur is a 68-year-old female with initial cT1c cN0 cM0 right breast invasive ductal carcinoma, grade 1, ER positive/WA positive, HER2 negative with Ki-67 of 10%.  She is status post right breast lumpectomy and sentinel lymph node  biopsy; pT1c pN0.  ECOG 0    I discussed the clinical, radiographic and pathologic findings to date with Ms. Kaur and her .  I explained the role of radiotherapy in the management of breast conserving therapy, outlining the rationale for this approach.  We discussed both hypofractionated whole breast radiotherapy and accelerated partial breast irradiation.  I explained that she is a candidate for either approach but delivery of accelerated partial breast irradiation may be classically considered cautionary at best given the distance from DCIS.  Given the current OSCAR guidelines, the negative margin for DCIS is appropriate for delivery of APBI.  We discussed the risks, potential benefits and alternatives to this approach.  Ms. Kaur is agreeable to external beam radiotherapy and will undergo CT simulation for treatment planning purposes.      Neno Shirley MD  Radiation Oncology  [facility]    This document has been signed by Neno Sihrley MD on March 8, 2024 17:31 EST

## 2024-03-08 NOTE — LETTER
March 8, 2024     Ziggy Andersen MD  [facility] [address] [id number]    Patient: Delores Kaur   YOB: 1955   Date of Visit: 3/8/2024     Dear Ziggy Andersen MD:       I saw Delores Kaur in consultation today regarding radiotherapy for early stage breast cancer post lumpectomy and sentinel lymph node biopsy.  Below are the relevant portions of my assessment and plan of care.    If you have questions, please do not hesitate to call me. I look forward to following Mrs. Kaur along with you.         Sincerely,        Neno Shirley MD        CC: MD Casper Reis, Neno KUNZ MD  03/08/24 1734  Sign when Signing Visit       New Patient Office Visit      Encounter Date: 03/08/2024   Patient Name: Delores Kaur  YOB: 1955   Medical Record Number: [id number]   Primary Diagnosis: Malignant neoplasm of central portion of right breast in female, estrogen receptor positive [C50.111, Z17.0]       Chief Complaint:    Chief Complaint   Patient presents with   • Consult   • Breast Cancer       History of Present Illness: Delores Kaur 68-year-old female with early-stage invasive ductal carcinoma of the right breast status postlumpectomy and sentinel lymph node biopsy who is seen in consultation regarding radiotherapy as a component of breast conserving therapy.  Ms. Kaur underwent screening mammography on 11/7/2023 revealing a focal asymmetry in the central right breast, middle third depth.  Diagnostic mammogram and ultrasound performed on 11/30/2023 revealed a 1.2 cm irregular mass in the lower central right breast, middle third depth.  Ultrasound performed the same day revealed a 1.1 x 1.1 cm irregular solid mass at the 6 o'clock position, 3 cm from the nipple with no axillary lymphadenopathy.  Pathology from biopsy of this lesion performed on 12/6/2023 revealed invasive ductal carcinoma, grade 1, ER positive/CA positive, HER2 negative with Ki-67 of 10%.  There was associated  low-grade DCIS.  On 1/29/2024, Mrs. Kaur underwent lumpectomy and sentinel of node biopsy per Dr. Andersen at [facility] with pathology revealing invasive ductal carcinoma, grade 1 measuring 13 mm in greatest dimension, ER positive/[place] positive, HER2/renuka negative with Ki-67 of 10%.  All margins were negative for invasive carcinoma with the closest margin being 2 mm.  The closest margin from DCIS was 1 mm laterally.  A total of 4 sentinel lymph nodes were removed and all were negative for invasive carcinoma.  Ms. Kaur reports feeling well overall after surgery with no complaints.    Subjective        Review of Systems: Review of Systems   Constitutional:  Negative for appetite change, chills, fatigue, fever and unexpected weight change.   HENT:  Negative for hearing loss, sore throat and trouble swallowing.    Eyes:  Negative for visual disturbance.   Respiratory:  Negative for cough, chest tightness and shortness of breath.    Cardiovascular:  Negative for chest pain and palpitations.   Gastrointestinal:  Negative for abdominal pain, blood in stool, constipation, diarrhea, nausea and vomiting.   Genitourinary:  Negative for difficulty urinating, dysuria, frequency, hematuria and urgency.   Musculoskeletal:  Negative for arthralgias, back pain, gait problem and myalgias.   Skin:  Negative for color change, rash and wound.   Neurological:  Negative for dizziness, weakness, light-headedness and headaches.   Psychiatric/Behavioral:  Positive for sleep disturbance (wakes frequently). Negative for self-injury and suicidal ideas.        Past Medical History:   Past Medical History:   Diagnosis Date   • Broken bones     Right humerous   • Cataract    • Diabetes mellitus    • Hypothyroidism    • Obesity Jan 1998   • Osteopenia Jan 2010   • Thyroid nodule    • Visual impairment contacts       Past Surgical History:   Past Surgical History:   Procedure Laterality Date   • BREAST SURGERY Left     BIOPSY   •  CATARACT EXTRACTION, BILATERAL     •  SECTION     • COLONOSCOPY     • COLONOSCOPY N/A 10/10/2022    Procedure: COLONOSCOPY;  Surgeon: James Camacho MD;  Location: [facility] ENDOSCOPY;  Service: General;  Laterality: N/A;  NORMAL COLONOSCOPY   • ENDOSCOPY N/A 10/10/2022    Procedure: ESOPHAGOGASTRODUODENOSCOPY WITH BXS;  Surgeon: James Camacho MD;  Location: [facility] ENDOSCOPY;  Service: General;  Laterality: N/A;  ESOPHAGITIS   • EYE SURGERY      retina sx   • HYSTERECTOMY     • TONSILLECTOMY         Family History:   Family History   Problem Relation Age of Onset   • Cancer Mother         Myelodysplastic   • Other Mother    • Cancer Father         oral cancer of soft pallet   • Breast cancer Maternal Aunt    • Breast cancer Other         Aunt   • Malig Hyperthermia Neg Hx        Social History:   Social History     Socioeconomic History   • Marital status:    Tobacco Use   • Smoking status: Former     Current packs/day: 0.00     Average packs/day: 1.5 packs/day for 23.3 years (35.0 ttl pk-yrs)     Types: Cigarettes     Start date: 1976     Quit date: 2000     Years since quittin.2   • Smokeless tobacco: Never   • Tobacco comments:     smoked for about 20 years, quit since about    Vaping Use   • Vaping status: Never Used   Substance and Sexual Activity   • Alcohol use: Never   • Drug use: Never   • Sexual activity: Yes     Partners: Male     Comment: hysterectomy       Medications:     Current Outpatient Medications:   •  Semaglutide,0.25 or 0.5MG/DOS, (Ozempic, 0.25 or 0.5 MG/DOSE,) 2 MG/1.5ML solution pen-injector, Inject 0.5 mg under the skin into the appropriate area as directed 1 (One) Time Per Week., Disp: 4.5 mL, Rfl: 1  •  tretinoin (RETIN-A) 0.025 % cream, APPLY A PEA-SIZED AMOUNT TO FACE AT BEDTIME, Disp: , Rfl:   •  BIOTIN PO, Take 500 mg by mouth Daily. (Patient not taking: Reported on 3/8/2024), Disp: , Rfl:   •  Calcium Carbonate (CALCIUM 500 PO), Take  500 mg by mouth Daily. (Patient not taking: Reported on 3/8/2024), Disp: , Rfl:   •  cholecalciferol (VITAMIN D3) 10 MCG (400 UNIT) tablet, Take 1 tablet by mouth Daily. (Patient not taking: Reported on 3/8/2024), Disp: , Rfl:   •  FIBER PO, Take  by mouth Daily. (Patient not taking: Reported on 3/8/2024), Disp: , Rfl:     Allergies:   No Known Allergies    Pain: (on a scale of 0-10)   Pain Score    03/08/24 1444   PainSc: 0-No pain       Delores Kaur reports a pain score of 0.  Given her pain assessment as noted, treatment options were discussed and the following options were decided upon as a follow-up plan to address the patient's pain: continuation of current treatment plan for pain.     Advanced Care Plan: N   Quality of Life: 100 - Full Activity    Objective     Physical Exam:   Vital Signs:   Vitals:    03/08/24 1444   BP: 109/59   Pulse: 66   Resp: 18   Temp: 98.1 °F (36.7 °C)   TempSrc: Temporal   SpO2: 92%   Weight: 88.7 kg (195 lb 8.8 oz)   PainSc: 0-No pain     Body mass index is 33.57 kg/m².     Physical Exam  Constitutional:       General: She is not in acute distress.     Appearance: She is normal weight. She is not toxic-appearing.   HENT:      Head: Normocephalic and atraumatic.   Pulmonary:      Effort: Pulmonary effort is normal. No respiratory distress.   Chest:       Skin:     General: Skin is warm and dry.      Coloration: Skin is not jaundiced.   Neurological:      General: No focal deficit present.      Mental Status: She is alert and oriented to person, place, and time.      Cranial Nerves: No cranial nerve deficit.      Gait: Gait normal.   Psychiatric:         Mood and Affect: Mood normal.         Behavior: Behavior normal.         Judgment: Judgment normal.         Results:   Radiographs: I personally reviewed the mammogram performed on 11/30/2023.  The pertinent findings are as above in HPI.    Pathology: I personally reviewed the pathology reports from the procedures performed on  12/6/2023 and 1/29/2024.  The pertinent findings are as above in HPI.      Labs:   WBC   Date Value Ref Range Status   11/10/2023 6.61 4.5 - 11.0 10*3/uL Final     Hemoglobin   Date Value Ref Range Status   11/10/2023 13.7 12.0 - 16.0 g/dL Final     Hematocrit   Date Value Ref Range Status   11/10/2023 40.6 36.0 - 46.0 % Final     Platelets   Date Value Ref Range Status   11/10/2023 339 140 - 440 10*3/uL Final       Assessment / Plan      Assessment/Plan:   Delores Kaur is a 68-year-old female with initial cT1c cN0 cM0 right breast invasive ductal carcinoma, grade 1, ER positive/CT positive, HER2 negative with Ki-67 of 10%.  She is status post right breast lumpectomy and sentinel lymph node biopsy; pT1c pN0.  ECOG 0    I discussed the clinical, radiographic and pathologic findings to date with Ms. Kaur and her .  I explained the role of radiotherapy in the management of breast conserving therapy, outlining the rationale for this approach.  We discussed both hypofractionated whole breast radiotherapy and accelerated partial breast irradiation.  I explained that she is a candidate for either approach but delivery of accelerated partial breast irradiation may be classically considered cautionary at best given the distance from DCIS.  Given the current OSCAR guidelines, the negative margin for DCIS is appropriate for delivery of APBI.  We discussed the risks, potential benefits and alternatives to this approach.  Ms. Kaur is agreeable to external beam radiotherapy and will undergo CT simulation for treatment planning purposes.      Neno Shirley MD  Radiation Oncology  [facility]    This document has been signed by Neno Shirley MD on March 8, 2024 17:31 EST

## 2024-03-15 ENCOUNTER — HOSPITAL ENCOUNTER (OUTPATIENT)
Dept: RADIATION ONCOLOGY | Facility: HOSPITAL | Age: 69
Discharge: HOME | End: 2024-03-15

## 2024-03-15 DIAGNOSIS — C50.111 PRIMARY MALIGNANT NEOPLASM OF CENTRAL PORTION OF RIGHT BREAST IN FEMALE: ICD-10-CM

## 2024-03-15 PROCEDURE — 77332 RADIATION TREATMENT AID(S): CPT | Performed by: RADIOLOGY

## 2024-03-20 ENCOUNTER — HOSPITAL ENCOUNTER (OUTPATIENT)
Dept: RADIATION ONCOLOGY | Facility: HOSPITAL | Age: 69
Setting detail: RADIATION/ONCOLOGY SERIES
Discharge: HOME | End: 2024-03-20
Payer: MEDICARE

## 2024-03-24 PROCEDURE — 77301 RADIOTHERAPY DOSE PLAN IMRT: CPT | Performed by: RADIOLOGY

## 2024-03-24 PROCEDURE — 77338 DESIGN MLC DEVICE FOR IMRT: CPT | Performed by: RADIOLOGY

## 2024-03-24 PROCEDURE — 77300 RADIATION THERAPY DOSE PLAN: CPT | Performed by: RADIOLOGY

## 2024-03-25 ENCOUNTER — HOSPITAL ENCOUNTER (OUTPATIENT)
Dept: RADIATION ONCOLOGY | Facility: HOSPITAL | Age: 69
Discharge: HOME | End: 2024-03-25
Payer: MEDICARE

## 2024-03-25 ENCOUNTER — HOSPITAL ENCOUNTER (OUTPATIENT)
Dept: RADIATION ONCOLOGY | Facility: HOSPITAL | Age: 69
Discharge: HOME | End: 2024-03-25

## 2024-03-25 VITALS
SYSTOLIC BLOOD PRESSURE: 114 MMHG | TEMPERATURE: 98 F | HEART RATE: 71 BPM | DIASTOLIC BLOOD PRESSURE: 52 MMHG | WEIGHT: 197.53 LBS | OXYGEN SATURATION: 99 % | BODY MASS INDEX: 33.91 KG/M2

## 2024-03-25 DIAGNOSIS — C50.111 MALIGNANT NEOPLASM OF CENTRAL PORTION OF RIGHT BREAST IN FEMALE, ESTROGEN RECEPTOR POSITIVE: Primary | ICD-10-CM

## 2024-03-25 DIAGNOSIS — Z17.0 MALIGNANT NEOPLASM OF CENTRAL PORTION OF RIGHT BREAST IN FEMALE, ESTROGEN RECEPTOR POSITIVE: Primary | ICD-10-CM

## 2024-03-25 LAB
RAD ONC ARIA COURSE ID: NORMAL
RAD ONC ARIA COURSE INTENT: NORMAL
RAD ONC ARIA COURSE LAST TREATMENT DATE: NORMAL
RAD ONC ARIA COURSE START DATE: NORMAL
RAD ONC ARIA COURSE TREATMENT ELAPSED DAYS: 0
RAD ONC ARIA FIRST TREATMENT DATE: NORMAL
RAD ONC ARIA PLAN FRACTIONS TREATED TO DATE: 1
RAD ONC ARIA PLAN ID: NORMAL
RAD ONC ARIA PLAN PRESCRIBED DOSE PER FRACTION: 6 GY
RAD ONC ARIA PLAN PRIMARY REFERENCE POINT: NORMAL
RAD ONC ARIA PLAN TOTAL FRACTIONS PRESCRIBED: 5
RAD ONC ARIA PLAN TOTAL PRESCRIBED DOSE: 3000 CGY
RAD ONC ARIA REFERENCE POINT DOSAGE GIVEN TO DATE: 6 GY
RAD ONC ARIA REFERENCE POINT ID: NORMAL
RAD ONC ARIA REFERENCE POINT SESSION DOSAGE GIVEN: 6 GY

## 2024-03-25 PROCEDURE — 77014 CHG CT GUIDANCE RADIATION THERAPY FLDS PLACEMENT: CPT | Performed by: RADIOLOGY

## 2024-03-25 PROCEDURE — 77385: CPT | Performed by: RADIOLOGY

## 2024-03-25 NOTE — PROGRESS NOTES
On Treatment Visit       Patient: Delores Kaur   YOB: 1955   Medical Record Number: [id number]     Date of Visit  March 25, 2024   Primary Diagnosis:Malignant neoplasm of central portion of right breast in female, estrogen receptor positive [C50.111, Z17.0]         was seen today for an on treatment visit.  She is receiving radiation therapy to the right breast. She  has received 600 cGy in 1 fractions out of a planned dose of 3000 cGy in 5 fractions.     Today on exam the patient is tolerating radiation therapy well and has no new disease or treatment-related complaints.                                           Review of Systems:   Review of Systems   Constitutional:  Negative for appetite change, chills, fatigue, fever and unexpected weight change.   HENT:  Negative for hearing loss, sore throat and trouble swallowing.    Eyes:  Negative for visual disturbance.   Respiratory:  Negative for cough, chest tightness and shortness of breath.    Cardiovascular:  Negative for chest pain and palpitations.   Gastrointestinal:  Negative for abdominal pain, blood in stool, constipation, diarrhea, nausea and vomiting.   Genitourinary:  Negative for difficulty urinating, dysuria, frequency, hematuria and urgency.   Musculoskeletal:  Negative for arthralgias, back pain, gait problem and myalgias.   Skin:  Negative for color change, rash and wound.   Neurological:  Negative for dizziness, weakness, light-headedness and headaches.   Psychiatric/Behavioral:  Positive for sleep disturbance (wakes frequently). Negative for self-injury and suicidal ideas.        Vitals:     Vitals:    03/25/24 1512   BP: 114/52   Pulse: 71   Temp: 98 °F (36.7 °C)   SpO2: 99%       Weight:   Wt Readings from Last 3 Encounters:   03/25/24 89.6 kg (197 lb 8.5 oz)   03/08/24 88.7 kg (195 lb 8.8 oz)   08/25/23 88.5 kg (195 lb)      Pain:    Pain Score    03/25/24 1512   PainSc: 0-No pain         Physical Exam:  Gen: WD/WN;  NAD  HEENT: MMM  Trachea: midline  Chest: symmetric  Resp: normal respiratory effort  Extr: warm, well-perfused  Neuro: awake and alert; no aphasia or neglect    Plan: I have reviewed treatment setup notes, checked and approved the daily guidance images.  I reviewed dose delivery, treatment parameters and deemed them appropriate. We plan to continue radiation therapy as prescribed.          Radiation Oncology    Electronically signed by Neno Shirley MD 3/25/2024  16:19 EDT

## 2024-03-27 ENCOUNTER — HOSPITAL ENCOUNTER (OUTPATIENT)
Dept: RADIATION ONCOLOGY | Facility: HOSPITAL | Age: 69
Discharge: HOME | End: 2024-03-27

## 2024-03-27 LAB
RAD ONC ARIA COURSE ID: NORMAL
RAD ONC ARIA COURSE INTENT: NORMAL
RAD ONC ARIA COURSE LAST TREATMENT DATE: NORMAL
RAD ONC ARIA COURSE START DATE: NORMAL
RAD ONC ARIA COURSE TREATMENT ELAPSED DAYS: 2
RAD ONC ARIA FIRST TREATMENT DATE: NORMAL
RAD ONC ARIA PLAN FRACTIONS TREATED TO DATE: 2
RAD ONC ARIA PLAN ID: NORMAL
RAD ONC ARIA PLAN PRESCRIBED DOSE PER FRACTION: 6 GY
RAD ONC ARIA PLAN PRIMARY REFERENCE POINT: NORMAL
RAD ONC ARIA PLAN TOTAL FRACTIONS PRESCRIBED: 5
RAD ONC ARIA PLAN TOTAL PRESCRIBED DOSE: 3000 CGY
RAD ONC ARIA REFERENCE POINT DOSAGE GIVEN TO DATE: 12 GY
RAD ONC ARIA REFERENCE POINT ID: NORMAL
RAD ONC ARIA REFERENCE POINT SESSION DOSAGE GIVEN: 6 GY

## 2024-03-27 PROCEDURE — 77014 CHG CT GUIDANCE RADIATION THERAPY FLDS PLACEMENT: CPT | Performed by: RADIOLOGY

## 2024-03-27 PROCEDURE — 77385: CPT | Performed by: RADIOLOGY

## 2024-03-29 ENCOUNTER — HOSPITAL ENCOUNTER (OUTPATIENT)
Dept: RADIATION ONCOLOGY | Facility: HOSPITAL | Age: 69
Discharge: HOME | End: 2024-03-29
Payer: MEDICARE

## 2024-03-29 LAB
RAD ONC ARIA COURSE ID: NORMAL
RAD ONC ARIA COURSE INTENT: NORMAL
RAD ONC ARIA COURSE LAST TREATMENT DATE: NORMAL
RAD ONC ARIA COURSE START DATE: NORMAL
RAD ONC ARIA COURSE TREATMENT ELAPSED DAYS: 4
RAD ONC ARIA FIRST TREATMENT DATE: NORMAL
RAD ONC ARIA PLAN FRACTIONS TREATED TO DATE: 3
RAD ONC ARIA PLAN ID: NORMAL
RAD ONC ARIA PLAN PRESCRIBED DOSE PER FRACTION: 6 GY
RAD ONC ARIA PLAN PRIMARY REFERENCE POINT: NORMAL
RAD ONC ARIA PLAN TOTAL FRACTIONS PRESCRIBED: 5
RAD ONC ARIA PLAN TOTAL PRESCRIBED DOSE: 3000 CGY
RAD ONC ARIA REFERENCE POINT DOSAGE GIVEN TO DATE: 18 GY
RAD ONC ARIA REFERENCE POINT ID: NORMAL
RAD ONC ARIA REFERENCE POINT SESSION DOSAGE GIVEN: 6 GY

## 2024-03-29 PROCEDURE — 77385: CPT | Performed by: RADIOLOGY

## 2024-03-29 PROCEDURE — 77014 CHG CT GUIDANCE RADIATION THERAPY FLDS PLACEMENT: CPT | Performed by: RADIOLOGY

## 2024-04-01 ENCOUNTER — HOSPITAL ENCOUNTER (OUTPATIENT)
Dept: RADIATION ONCOLOGY | Facility: HOSPITAL | Age: 69
Discharge: HOME | End: 2024-04-01
Payer: MEDICARE

## 2024-04-01 ENCOUNTER — HOSPITAL ENCOUNTER (OUTPATIENT)
Dept: RADIATION ONCOLOGY | Facility: HOSPITAL | Age: 69
Setting detail: RADIATION/ONCOLOGY SERIES
End: 2024-04-01
Payer: MEDICARE

## 2024-04-01 LAB
RAD ONC ARIA COURSE ID: NORMAL
RAD ONC ARIA COURSE INTENT: NORMAL
RAD ONC ARIA COURSE LAST TREATMENT DATE: NORMAL
RAD ONC ARIA COURSE START DATE: NORMAL
RAD ONC ARIA COURSE TREATMENT ELAPSED DAYS: 7
RAD ONC ARIA FIRST TREATMENT DATE: NORMAL
RAD ONC ARIA PLAN FRACTIONS TREATED TO DATE: 4
RAD ONC ARIA PLAN ID: NORMAL
RAD ONC ARIA PLAN PRESCRIBED DOSE PER FRACTION: 6 GY
RAD ONC ARIA PLAN PRIMARY REFERENCE POINT: NORMAL
RAD ONC ARIA PLAN TOTAL FRACTIONS PRESCRIBED: 5
RAD ONC ARIA PLAN TOTAL PRESCRIBED DOSE: 3000 CGY
RAD ONC ARIA REFERENCE POINT DOSAGE GIVEN TO DATE: 24 GY
RAD ONC ARIA REFERENCE POINT ID: NORMAL
RAD ONC ARIA REFERENCE POINT SESSION DOSAGE GIVEN: 6 GY

## 2024-04-01 PROCEDURE — 77385: CPT | Performed by: RADIOLOGY

## 2024-04-01 PROCEDURE — 77014 CHG CT GUIDANCE RADIATION THERAPY FLDS PLACEMENT: CPT | Performed by: RADIOLOGY

## 2024-04-03 ENCOUNTER — HOSPITAL ENCOUNTER (OUTPATIENT)
Dept: RADIATION ONCOLOGY | Facility: HOSPITAL | Age: 69
Discharge: HOME | End: 2024-04-03

## 2024-04-03 LAB
RAD ONC ARIA COURSE ID: NORMAL
RAD ONC ARIA COURSE INTENT: NORMAL
RAD ONC ARIA COURSE LAST TREATMENT DATE: NORMAL
RAD ONC ARIA COURSE START DATE: NORMAL
RAD ONC ARIA COURSE TREATMENT ELAPSED DAYS: 9
RAD ONC ARIA FIRST TREATMENT DATE: NORMAL
RAD ONC ARIA PLAN FRACTIONS TREATED TO DATE: 5
RAD ONC ARIA PLAN ID: NORMAL
RAD ONC ARIA PLAN PRESCRIBED DOSE PER FRACTION: 6 GY
RAD ONC ARIA PLAN PRIMARY REFERENCE POINT: NORMAL
RAD ONC ARIA PLAN TOTAL FRACTIONS PRESCRIBED: 5
RAD ONC ARIA PLAN TOTAL PRESCRIBED DOSE: 3000 CGY
RAD ONC ARIA REFERENCE POINT DOSAGE GIVEN TO DATE: 30 GY
RAD ONC ARIA REFERENCE POINT ID: NORMAL
RAD ONC ARIA REFERENCE POINT SESSION DOSAGE GIVEN: 6 GY

## 2024-04-03 PROCEDURE — 77336 RADIATION PHYSICS CONSULT: CPT | Performed by: RADIOLOGY

## 2024-04-03 PROCEDURE — 77385: CPT | Performed by: RADIOLOGY

## 2024-04-10 ENCOUNTER — PATIENT ROUNDING (BHMG ONLY) (OUTPATIENT)
Dept: RADIATION ONCOLOGY | Facility: HOSPITAL | Age: 69
End: 2024-04-10
Payer: MEDICARE

## 2024-04-11 ENCOUNTER — PATIENT ROUNDING (BHMG ONLY) (OUTPATIENT)
Dept: RADIATION ONCOLOGY | Facility: HOSPITAL | Age: 69
End: 2024-04-11
Payer: MEDICARE

## 2024-04-11 NOTE — PROGRESS NOTES
"Patient completed radiation treatment for breast cancer on 4/3/24. Following up with patient regarding any concerns the patient may have at this time and receiving feedback in regards to patient over all care while under treatment.     Patient asked about symptoms and side effects that continue to be bothersome no ( skin, swallowing etc)    Patient states that Pain is at 0 on scale of 0/10. Patient states medications have not changed.    Patient was asked if there was anything that could've made their experience better at [facility] while they were under treatment. Patient states: \"no\"    Patient encouraged to call the office if any concerns arise. Patient reminded of Follow up appointment on 5/17/24  "

## 2024-04-15 ENCOUNTER — PATIENT OUTREACH (OUTPATIENT)
Dept: ONCOLOGY | Facility: HOSPITAL | Age: 69
End: 2024-04-15
Payer: MEDICARE

## 2024-05-16 NOTE — PROGRESS NOTES
Follow Up Office Visit      Encounter Date: 05/17/2024   Patient Name: Delores Kaur  YOB: 1955   Medical Record Number: [id number]   Primary Diagnosis: Malignant neoplasm of central portion of right breast in female, estrogen receptor positive [C50.111, Z17.0]     Cancer Staging   No matching staging information was found for the patient.    Radiation Completion Date:  4/3/2024    Chief Complaint:    Chief Complaint   Patient presents with    Follow-up    Breast Cancer       Oncology/Hematology History   Primary malignant neoplasm of central portion of right breast in female   12/6/2023 Cancer Staged    Staging form: Breast, AJCC 8th Edition  - Clinical stage from 12/6/2023: Stage IA (cT1c, cN0, cM0, G1, ER+, OH+, HER2-) - Signed by Sharifa Harris APRN on 5/16/2024 1/29/2024 Cancer Staged    Staging form: Breast, AJCC 8th Edition  - Pathologic stage from 1/29/2024: Stage IA (pT1c, pN0(sn), cM0, G1, ER+, OH+, HER2-, Oncotype DX score: 12) - Signed by Sharifa Harris APRN on 5/17/2024     3/15/2024 Initial Diagnosis    Primary malignant neoplasm of central portion of right breast in female     3/25/2024 - 4/3/2024 Radiation    Radiation OncologyTreatment Course:  Delores Kaur received 3000 cGy in 5 fractions via APBI to the right breast.          History of Present Illness: Delores Kaur is a 69 y.o. female who returns to [facility] for short interval follow-up after completing external beam radiotherapy on 4/3/24. She reports feeling well overall with no treatment related concerns. She has no breast related concerns today. Denies breast pain or tenderness, palpable masses, nipple changes or nipple discharge, limited range of motion in upper extremity. She is anastrozole per her medical oncologist in [place] and is tolerating well. She has not been referred to Lymphedema Therapy.      Subjective      Review of Systems: Review of Systems   Constitutional:  Negative for activity  change, appetite change, chills, fatigue, fever and unexpected weight change.   HENT:  Negative for hearing loss, sore throat and trouble swallowing.    Eyes:  Positive for visual disturbance (Vision recently worsened, follows up with opthamology in July.).   Respiratory:  Negative for cough, chest tightness, shortness of breath and wheezing.    Cardiovascular:  Negative for chest pain, palpitations and leg swelling.   Gastrointestinal:  Negative for anal bleeding, blood in stool, constipation, diarrhea, nausea and vomiting.   Endocrine: Positive for heat intolerance (Occasional).   Genitourinary:  Negative for difficulty urinating, dysuria, frequency, hematuria and urgency.   Musculoskeletal:  Positive for arthralgias (Noted in random areas, noted after starting anastrazole.). Negative for back pain, gait problem and joint swelling.        States that right breast incision is sensitive, ongoing   Skin:  Negative for color change, rash and wound.   Neurological:  Negative for dizziness, weakness and headaches.   Psychiatric/Behavioral:  Positive for sleep disturbance (wakes frequently).        The following portions of the patient's history were reviewed and updated as appropriate: allergies, current medications, past family history, past medical history, past social history, past surgical history and problem list.    Medications:     Current Outpatient Medications:     Anastrozole (ARIMIDEX PO), Take 1 tablet by mouth Daily., Disp: , Rfl:     Cholecalciferol (Vitamin D3) 25 MCG (1000 UT) capsule, Take 1 capsule by mouth Daily. Takes when she remembers., Disp: , Rfl:     Semaglutide,0.25 or 0.5MG/DOS, (Ozempic, 0.25 or 0.5 MG/DOSE,) 2 MG/1.5ML solution pen-injector, Inject 0.5 mg under the skin into the appropriate area as directed 1 (One) Time Per Week., Disp: 4.5 mL, Rfl: 1    tretinoin (RETIN-A) 0.025 % cream, APPLY A PEA-SIZED AMOUNT TO FACE AT BEDTIME, Disp: , Rfl:     BIOTIN PO, Take 500 mg by mouth Daily.  (Patient not taking: Reported on 3/8/2024), Disp: , Rfl:     Calcium Carbonate (CALCIUM 500 PO), Take 500 mg by mouth Daily. (Patient not taking: Reported on 3/8/2024), Disp: , Rfl:     FIBER PO, Take  by mouth Daily. (Patient not taking: Reported on 3/8/2024), Disp: , Rfl:     Allergies:   No Known Allergies    Patient Smoking History:   Social History     Tobacco Use   Smoking Status Former    Current packs/day: 0.00    Average packs/day: 1.5 packs/day for 23.3 years (35.0 ttl pk-yrs)    Types: Cigarettes    Start date: 1976    Quit date: 2000    Years since quittin.3   Smokeless Tobacco Never   Tobacco Comments    smoked for about 20 years, quit since about        Measures:  PHQ-9 Total Score: 0   Quality of Life: 100 - Full Activity   ECOG score: 0  ECOG: (0) Fully active, able to carry on all predisease performance without restriction  Pain: (on a scale of 0-10)   Pain Score    24 1042   PainSc: 0-No pain       Objective     Physical Exam:   Vital Signs:   Vitals:    24 1042   BP: 113/63   Pulse: 70   Resp: 20   Temp: 98.5 °F (36.9 °C)   TempSrc: Temporal   SpO2: 98%   Weight: 87.4 kg (192 lb 10.9 oz)   PainSc: 0-No pain     Body mass index is 33.07 kg/m².   Wt Readings from Last 3 Encounters:   24 87.4 kg (192 lb 10.9 oz)   24 89.6 kg (197 lb 8.5 oz)   24 88.7 kg (195 lb 8.8 oz)       Physical Exam  Vitals reviewed.   Constitutional:       General: She is not in acute distress.     Appearance: Normal appearance. She is normal weight. She is not ill-appearing.   HENT:      Head: Normocephalic and atraumatic.      Mouth/Throat:      Mouth: Mucous membranes are moist.      Pharynx: Oropharynx is clear. No oropharyngeal exudate or posterior oropharyngeal erythema.   Eyes:      Conjunctiva/sclera: Conjunctivae normal.      Pupils: Pupils are equal, round, and reactive to light.   Cardiovascular:      Rate and Rhythm: Normal rate and regular rhythm.      Pulses: Normal  pulses.      Heart sounds: Normal heart sounds.   Pulmonary:      Effort: Pulmonary effort is normal. No respiratory distress.      Breath sounds: Normal breath sounds.   Chest:   Breasts:     Right: No swelling, bleeding, inverted nipple, mass, nipple discharge, skin change or tenderness.      Left: No swelling, bleeding, inverted nipple, mass, nipple discharge, skin change or tenderness.       Musculoskeletal:         General: Normal range of motion.      Cervical back: Normal range of motion.      Comments: Full ROM BUE   Lymphadenopathy:      Upper Body:      Right upper body: No supraclavicular or axillary adenopathy.      Left upper body: No supraclavicular or axillary adenopathy.   Skin:     General: Skin is warm and dry.   Neurological:      General: No focal deficit present.      Mental Status: She is alert and oriented to person, place, and time. Mental status is at baseline.   Psychiatric:         Mood and Affect: Mood normal.         Behavior: Behavior normal.       Result Review: I independently reviewed the following data.   -- Surgical pathology on 1/29/24 reviewed via Care Everywhere  -- Tissue Pathology Exam (12/06/2023 09:49)   -- Oncotype DX score dated 1/29/24 reviewed via Care Everywhere    Pathology:   Lab Results   Component Value Date    CLININFO  12/06/2023     Right breast mass      FINALDX  12/06/2023     Right breast,  6 o'clock position, 3 cm from nipple, ultrasound-guided core biopsy:  - Invasive carcinoma of no special type (ductal)  - Histologic grade (Isis Histologic Score):    - Glandular (Acinar)/Tubular Differentiation:  Score 2  - Nuclear Pleomorphism:  Score 1  - Mitotic Rate:  Score 1  - Overall Grade:  Grade 1   - Size of largest focus of invasion:  10 mm   - Breast biomarker testing:    - Estrogen Receptor (ER):  Positive (95%, strong)    - Progesterone Receptor (PgR):  Positive (90%, strong)    - HER2 (by immunohistochemistry):  Equivocal (Score 2+), see comment  -  Ki-67: 10%   - Other findings:      - 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    REMARKS: The above positive (malignant) diagnosis was called to Gloria KEITH in Dr. Hudson's office at 15:19 EST on 23 by Festus Joseph,  and also to SOFY Noel, RN, designee for Dr. Avalos at 08:35 EST on 2023 by Festus Joseph.       SYNOPTIC  2023     Breast Biomarker Reporting Template  BREAST: BIOMARKER REPORTING TEMPLATE - All Specimens  Protocol posted: 3/22/2023       Test(s) Performed:           Estrogen Receptor (ER) Status:    Positive (greater than 10% of cells demonstrate nuclear positivity)          Percentage of Cells with Nuclear Positivity:    95 %         Average Intensity of Staining:    Strong        Test Type:    Food and Drug Administration (FDA) cleared (test / vendor): Roche        Primary Antibody:    SP1      Test(s) Performed:           Progesterone Receptor (PgR) Status:    Positive          Percentage of Cells with Nuclear Positivity:    90 %         Average Intensity of Staining:    Strong        Test Type:    Food and Drug Administration (FDA) cleared (test / vendor): Roche        Primary Antibody:    1E2      Test(s) Performed:           HER2 by Immunohistochemistry:    Equivocal (Score 2+)          Percentage of Cells with Uniform Intense Complete Membrane Stainin %       Test Type:    Food and Drug Administration (FDA) cleared (test / vendor): Roche        Primary Antibody:    4B5      Test(s) Performed:    Ki-67        Ki-67 Percentage of Positive Nuclei:    10 %       Primary Antibody:    30-9      Cold Ischemia and Fixation Times:    Meet requirements specified in latest version of the ASCO / CAP Guidelines        Imaging: No radiology results for the last 90 days.     Labs:   WBC   Date Value Ref Range  Status   03/21/2024 5.99 4.5 - 11.0 10*3/uL Final     Hemoglobin   Date Value Ref Range Status   03/21/2024 13.9 12.0 - 16.0 g/dL Final     Hematocrit   Date Value Ref Range Status   03/21/2024 41.9 36.0 - 46.0 % Final     Platelets   Date Value Ref Range Status   03/21/2024 300 140 - 440 10*3/uL Final     Creatinine   Date Value Ref Range Status   08/24/2023 0.71 0.57 - 1.00 mg/dL Final     BUN   Date Value Ref Range Status   08/24/2023 10 8 - 23 mg/dL Final     eGFR   Date Value Ref Range Status   08/24/2023 93.9 >60.0 mL/min/1.73 Final     TSH   Date Value Ref Range Status   08/24/2023 0.340 0.270 - 4.200 uIU/mL Final     Assessment / Plan      Impression: Delores Kaur is a pleasant 69 y.o. female with initial cT1c cN0 cM0 invasive ductal carcinoma of the right breast, grade 1, ER/WA positive, HER2 equivocal by IHC, negative by FISH with Ki-67 of 10%. Oncotype DX score of 12. She is status post right breast lumpectomy and SLNB on 1/29/2024 with Dr. Andersen at [facility]; pathology pT1c pN0. All margins negative. A total of 4 sentinel lymph nodes were removed and all were negative for carcinoma. She is now status post accelerated partial breast irradiation to the right breast, completed on 4/3/2024. Received 3000 cGy in 5 fractions. She is doing well overall and remains without evidence of disease clinically. Recommend referral to Lymphedema Clinic for surveillance program. She is taking anastrozole and is tolerating well.     Assessment/Plan:   Diagnoses and all orders for this visit:    1. Malignant neoplasm of central portion of right breast in female, estrogen receptor positive (Primary)  -     Ambulatory Referral to Lymphedema Clinic    2. Status post radiation therapy within four to twelve weeks    3. Encounter for follow-up examination after completed treatment for malignant neoplasm    4. At risk for lymphedema  -     Ambulatory Referral to Lymphedema Clinic       Follow Up:   Return for  follow-up in 6 months.   Discussed recommendation per NCCN guidelines for waiting at least 6-months after completion of radiotherapy to begin annual mammogram surveillance.   Follow-up with Dr. Mccullough at [facility] as scheduled.    Referral to Lymphedema Clinic for surveillance program.    Return in about 6 months (around 11/17/2024) for Office Visit.  Delores Kaur was encouraged to contact me in the interim with any questions or concerns regarding her care.      SALVADOR Bhardwaj  Radiation Oncology  [facility]    This document has been signed by SALVADOR Dubois on May 17, 2024 12:21 EDT

## 2024-05-17 ENCOUNTER — OFFICE VISIT (OUTPATIENT)
Dept: RADIATION ONCOLOGY | Facility: HOSPITAL | Age: 69
End: 2024-05-17
Payer: MEDICARE

## 2024-05-17 VITALS
BODY MASS INDEX: 33.07 KG/M2 | TEMPERATURE: 98.5 F | HEART RATE: 70 BPM | DIASTOLIC BLOOD PRESSURE: 63 MMHG | OXYGEN SATURATION: 98 % | SYSTOLIC BLOOD PRESSURE: 113 MMHG | WEIGHT: 192.68 LBS | RESPIRATION RATE: 20 BRPM

## 2024-05-17 DIAGNOSIS — Z17.0 MALIGNANT NEOPLASM OF CENTRAL PORTION OF RIGHT BREAST IN FEMALE, ESTROGEN RECEPTOR POSITIVE: Primary | ICD-10-CM

## 2024-05-17 DIAGNOSIS — C50.111 MALIGNANT NEOPLASM OF CENTRAL PORTION OF RIGHT BREAST IN FEMALE, ESTROGEN RECEPTOR POSITIVE: Primary | ICD-10-CM

## 2024-05-17 DIAGNOSIS — Z92.3 STATUS POST RADIATION THERAPY WITHIN FOUR TO TWELVE WEEKS: ICD-10-CM

## 2024-05-17 DIAGNOSIS — Z91.89 AT RISK FOR LYMPHEDEMA: ICD-10-CM

## 2024-05-17 DIAGNOSIS — Z08 ENCOUNTER FOR FOLLOW-UP EXAMINATION AFTER COMPLETED TREATMENT FOR MALIGNANT NEOPLASM: ICD-10-CM

## 2024-05-17 PROCEDURE — G0463 HOSPITAL OUTPT CLINIC VISIT: HCPCS | Performed by: NURSE PRACTITIONER

## 2024-05-20 ENCOUNTER — TRANSCRIBE ORDERS (OUTPATIENT)
Dept: ADMINISTRATIVE | Facility: HOSPITAL | Age: 69
End: 2024-05-20
Payer: MEDICARE

## 2024-05-20 DIAGNOSIS — M85.89 OSTEOPENIA OF MULTIPLE SITES: Primary | ICD-10-CM

## 2024-05-30 ENCOUNTER — HOSPITAL ENCOUNTER (OUTPATIENT)
Dept: OCCUPATIONAL THERAPY | Facility: HOSPITAL | Age: 69
Setting detail: THERAPIES SERIES
Discharge: HOME | End: 2024-05-30
Payer: MEDICARE

## 2024-05-30 DIAGNOSIS — Z91.89 AT RISK FOR LYMPHEDEMA: Primary | ICD-10-CM

## 2024-05-30 DIAGNOSIS — C50.411 MALIGNANT NEOPLASM OF UPPER-OUTER QUADRANT OF RIGHT BREAST IN FEMALE, ESTROGEN RECEPTOR POSITIVE: ICD-10-CM

## 2024-05-30 DIAGNOSIS — Z17.0 MALIGNANT NEOPLASM OF UPPER-OUTER QUADRANT OF RIGHT BREAST IN FEMALE, ESTROGEN RECEPTOR POSITIVE: ICD-10-CM

## 2024-05-30 PROCEDURE — 93702 BIS XTRACELL FLUID ANALYSIS: CPT | Performed by: OCCUPATIONAL THERAPIST

## 2024-05-30 PROCEDURE — 97165 OT EVAL LOW COMPLEX 30 MIN: CPT | Performed by: OCCUPATIONAL THERAPIST

## 2024-05-30 NOTE — THERAPY EVALUATION
Outpatient Occupational Therapy Lymphedema Initial Evaluation   [place]     Patient Name: Delores Kaur  : 1955  MRN: [id number]  Today's Date: 2024      Visit Date: 2024    Patient Active Problem List   Diagnosis    Thyroid nodule    Class 1 obesity due to excess calories without serious comorbidity with body mass index (BMI) of 33.0 to 33.9 in adult    Prediabetes    Dysphagia    GERD without esophagitis    Screening for malignant neoplasm of colon    Vitamin D deficiency    Mixed hyperlipidemia    Primary malignant neoplasm of central portion of right breast in female        Past Medical History:   Diagnosis Date    Broken bones     Right humerous    Cataract     Diabetes mellitus     Hypothyroidism     Obesity 1998    Osteopenia 2010    Thyroid nodule     Visual impairment contacts        Past Surgical History:   Procedure Laterality Date    BREAST LUMPECTOMY Right     with 3 lymph nodes removed.    BREAST SURGERY Left     BIOPSY    CATARACT EXTRACTION, BILATERAL       SECTION      COLONOSCOPY      COLONOSCOPY N/A 10/10/2022    Procedure: COLONOSCOPY;  Surgeon: James Camacho MD;  Location: [facility] ENDOSCOPY;  Service: General;  Laterality: N/A;  NORMAL COLONOSCOPY    ENDOSCOPY N/A 10/10/2022    Procedure: ESOPHAGOGASTRODUODENOSCOPY WITH BXS;  Surgeon: James Camacho MD;  Location: [facility] ENDOSCOPY;  Service: General;  Laterality: N/A;  ESOPHAGITIS    EYE SURGERY      retina sx    HYSTERECTOMY      TONSILLECTOMY  1969         Visit Dx:     ICD-10-CM ICD-9-CM   1. At risk for lymphedema  Z91.89 V49.89   2. Malignant neoplasm of upper-outer quadrant of right breast in female, estrogen receptor positive  C50.411 174.4    Z17.0 V86.0        Patient History       Row Name 24 0700             History    Chief Complaint --  Lymphedema surveillance program  -TD      Brief Description of Current Complaint Karishma is a 69-year-old female with a recent diagnosis of  invasive ductal carcinoma ER/MA positive HER2 negative of the right breast.  Patient underwent a right-sided lumpectomy on January 29, 2024 where she had 4 lymph nodes removed.  Patient subsequently underwent 5 rounds of radiation to the right breast.  -TD         Fall Risk Assessment    Any falls in the past year: No  -TD      Does patient have a fear of falling No  -TD         Services    Are you currently receiving Home Health services No  -TD      Do you plan to receive Home Health services in the near future No  -TD         Daily Activities    Primary Language English  -TD      Are you able to read Yes  -TD      Are you able to write Yes  -TD      How does patient learn best? Listening;Reading;Demonstration;Pictures/Video  -TD         Safety    Are you being hurt, hit, or frightened by anyone at home or in your life? No  -TD      Are you being neglected by a caregiver No  -TD      Have you had any of the following issues with N/A  -TD                User Key  (r) = Recorded By, (t) = Taken By, (c) = Cosigned By      Initials Name Provider Type    TD Emmie Sheets OT Occupational Therapist                     Lymphedema       Row Name 05/30/24 0700             Subjective Pain    Able to rate subjective pain? yes  -TD      Pre-Treatment Pain Level 0  -TD      Post-Treatment Pain Level 0  -TD         Subjective    Subjective Comments Pt reports that she feels good with no pain.  -TD         Lymphedema Assessment    Lymphedema Classification RUE:;at risk/stage 0  -TD      Lymphedema Cancer Related Sx right;lumpectomy;sentinel node biopsy  -TD      Lymphedema Surgery Comments 1/29/24  -TD      Lymph Nodes Removed # 4  -TD      Positive Lymph Nodes # 0  -TD      Chemo Received no  -TD      Radiation Therapy Received yes  -TD      Radiation Treatments #/Timeframe 5  -TD         LLIS - Physical Concerns    The amount of pain associated with my lymphedema is: 0  -TD      The amount of limb heaviness associated with my  "lymphedema is: 0  -TD      The amount of skin tightness associated with my lymphedema is: 0  -TD      The size of my swollen limb(s) seems: 0  -TD      Lymphedema affects the movement of my swollen limb(s): 0  -TD      The strength in my swollen limb(s) is: 0  -TD         LLIS - Psychosocial Concerns    Lymphedema affects my body image (i.e., \"how I think I look\"). 0  -TD      Lymphedema affects my socializing with others. 0  -TD      Lymphedema affects my intimate relations with spouse or partner (rate 0 if not applicable 0  -TD      Lymphedema \"gets me down\" (i.e., depression, frustration, or anger) 0  -TD      I must rely on others for help due to my lymphedema. 0  -TD      I know what to do to manage my lymphedema 4  -TD         LLIS - Functional Concerns    Lymphedema affects my ability to perform self-care activities (i.e. eating, dressing, hygiene) 0  -TD      Lymphedema affects my ability to perform routine home or work-related activities. 0  -TD      Lymphedema affects my performance of preferred leisure activities. 0  -TD      Lymphedema affects proper fit of clothing/shoes 0  -TD      Lymphedema affects my sleep 0  -TD         Posture/Observations    Posture- WNL Posture is WNL  -TD         General ROM    GENERAL ROM COMMENTS BUE are WFL  -TD         MMT (Manual Muscle Testing)    General MMT Comments BUE are WFL  -TD         L-Dex Bioimpedence Screening    L-Dex Measurement Extremity RUE  -TD      L-Dex Patient Position Standing  -TD      L-Dex UE Dominate Side Right  -TD      L-Dex UE At Risk Side Right  -TD      L-Dex UE Pre Surgical Value No  -TD      L-Dex UE Score 2.9  -TD      L-Dex UE Baseline Score 2.9  -TD      L-Dex UE Value Change 0  -TD      L-Dex UE Comment baseline  -TD      $ L-Dex Charge yes  -TD         Lymphedema Life Impact Scale Totals    A.  Total Q1 - Q17 (Do not include Q18) 4  -TD      B.  Total number of questions answered (Q1-Q17) 17  -TD      C. Divide A by B 0.24  -TD      D. " Multiple C by 25 6  -TD                User Key  (r) = Recorded By, (t) = Taken By, (c) = Cosigned By      Initials Name Provider Type    Emmie Conrad OT Occupational Therapist                      The patient had a baseline SOZO measurement which I reviewed today. The score is   WFL, see scanned to EMR. Bioimpedance spectroscopy helps identify the   onset of lymphedema in an arm or leg before patients experience noticeable swelling. Research has   shown that 92% of patients with early detection of lymphedema using L-Dex combined with   intervention do not progress to chronic lymphedema through three years. Additionally, as of March 2023, the NCCN Guidelines® for Survivorship recommend proactive screening for lymphedema using   bioimpedance spectroscopy. Whenever possible, patients are tested for baseline L-Dex score before   cancer treatment begins and then are reassessed during regular follow-up visits using the SOZO device.   Otherwise, this can be started postoperatively and continued during regular follow-up visits. If the   patient’s L-Dex score increases above normal levels, that is a sign that lymphedema is developing and a   referral is made to physical therapy for further evaluation and early compression treatment.   Lymphedema assessment with the SOZO L-Dex score is recommended to be done every 3 months for   the first 3 years and then every 6 months for years 4 and 5 followed by annually afterwards         Therapy Education  Education Details: Patient was educated on lymphedema and the expectations of the lymphedema clinic.  Patient provided education on risk factors for lymphedema to include greater than 6 lymph node removal, BMI greater than 30, mastectomy versus lumpectomy, radiation therapy, and Taxol use and advanced age. Patient provided with the Le[employer] lymphedema action plan stoplight to recovery handout (Rehabilitation Oncology: July 2019 - Volume 37 - Issue 3 - p 122127 doi:  10.1097/01.[id number]) to improve patient's ability to identify lymph exacerbation and provide guidance on appropriate action to be taken to control symptoms. Patient provided with education on a simple self-manual lymphatic drainage technique. Handouts provided. Patient exhibited fair return demonstration of skill. Patient will benefit from continued education to ensure carryover skill  Given: HEP, Symptoms/condition management, Edema management, Pain management  Program: New  How Provided: Verbal, Demonstration, Written  Provided to: Patient  Level of Understanding: Teach back education performed, Verbalized, Demonstrated         OT Goals       Row Name 05/30/24 0855          Time Calculation    OT Goal Re-Cert Due Date 06/29/24  -TD               User Key  (r) = Recorded By, (t) = Taken By, (c) = Cosigned By      Initials Name Provider Type    Emmie Conrad OT Occupational Therapist                  1. Post Breast Surgery Care/at risk for Lymphedema  LTG 1: 90 days:  As an indicator of no exacerbation of lymphedema staging, the patient will present with an L-Dex score less than [10] points from preoperative baseline.   STATUS: New  STG 1a:   30 days: To prevent exacerbation of mixed edema to lymphedema, patient will utilize the 2 postsurgical compression garments daily.      STATUS: New  STG 1b: 30 days: Patient will be independent with self-manual lymphatic massage.    STATUS: New  STG 1c: 30 days:  Patient will be independent with identification of signs and symptoms of lymphedema exasperation per stoplight to recovery education handout.   STATUS: New  STG 1 d: 30 days: Patient will be independent with HEP to prevent advancement in lymphedema staging.   STATUS: New  TREATMENT:  Self Care/ADL retraining, Therapeutic Activity, Neuromuscular Re-education, Therapeutic Exercise, Bioimpedence Fluid Analysis, [facility]-Surgical compression g[facility]ment [address] [facility] [employer] Cam[employer] Kit 2860K, Orthotic  Management and training,  and Manual Therapy.       OT Assessment/Plan       Row Name 05/30/24 0703          OT Assessment    Functional Limitations Limitations in functional capacity and performance  -TD     Impairments Impaired lymphatic circulation  -TD     Assessment Comments Karishma is a 69-year-old female with a recent diagnosis of invasive ductal carcinoma ER/AL positive HER2 negative of the right breast. Patient underwent a right-sided lumpectomy on January 29, 2024 where she had 4 lymph nodes removed. Patient subsequently underwent 5 rounds of radiation to the right breast.  Patient would benefit from continued skilled occupational therapy to prevent increased staging of lymphedema, increased pain, and decreased range of motion.  -TD     OT Diagnosis At risk for lymphedema  -TD     OT Rehab Potential Good  -TD     Patient/caregiver participated in establishment of treatment plan and goals Yes  -TD     Patient would benefit from skilled therapy intervention Yes  -TD        OT Plan    OT Frequency --  See duration  -TD     Predicted Duration of Therapy Intervention (OT) Patient will be seen every 3 months years 1 through 3 and every 6 months years 4 and 5.  -TD     Planned CPT's? OT EVAL LOW COMPLEXITY: 87186;OT RE-EVAL: 73130;OT THER ACT EA 15 MIN: 15650YA;OT THER PROC EA 15 MIN: 93230YG;OT SELF CARE/MGMT/TRAIN 15 MIN: 90788;OT MANUAL THERAPY EA 15 MIN: 72968;OT BIS XTRACELL FLUID ANALYSIS: 18691;OT CARE PLAN EA 15 MIN;OT ORTHOTIC MGMT/TRAIN EA 15 MIN: 46090;OT ORTHO/PROSTHET CHECKOUT EA 15 MIN: 56020  -TD     Planned Therapy Interventions (Optional Details) home exercise program;stretching;strengthening;ROM (Range of Motion);prosthetic fitting/training;orthotic fitting/training;patient/family education  -TD     OT Plan Comments Initiate plan of care  -TD               User Key  (r) = Recorded By, (t) = Taken By, (c) = Cosigned By      Initials Name Provider Type    Emmie Conrad OT Occupational Therapist                   OT eval complexity:   Examination:   Performance Deficits include:  dressing, bathing, grooming   # deficits affecting performance:  3  Decision Making:   Treatment Options Considered : adaption, remediation, prevention  # Treatment options considered : 3  Modification Made for: environment, task   Level of Task Modification: min/mod  Comorbidity Affect Performance: none  How is performance affected: n/a  Evaluation Level Determined:  low             Time Calculation:   Untimed Charges  OT Eval/Re-eval Minutes: 55  Total Minutes  Untimed Charges Total Minutes: 55   Total Minutes: 55     Therapy Charges for Today       Code Description Service Date Service Provider Modifiers Qty    99173061383 HC PT BIS XTRACELL FLUID ANALYSIS 5/30/2024 Emmie Shetes, OT  1    99919408612 HC OT EVAL LOW COMPLEXITY 4 5/30/2024 Emmie Sheets OT GO 1                      Emmie Sheets OT  5/30/2024

## 2024-06-03 ENCOUNTER — HOSPITAL ENCOUNTER (OUTPATIENT)
Dept: BONE DENSITY | Facility: HOSPITAL | Age: 69
Discharge: HOME | End: 2024-06-03
Payer: MEDICARE

## 2024-06-03 DIAGNOSIS — M85.89 OSTEOPENIA OF MULTIPLE SITES: ICD-10-CM

## 2024-06-03 PROCEDURE — 77080 DXA BONE DENSITY AXIAL: CPT

## 2024-08-28 ENCOUNTER — HOSPITAL ENCOUNTER (OUTPATIENT)
Dept: OCCUPATIONAL THERAPY | Facility: HOSPITAL | Age: 69
Setting detail: THERAPIES SERIES
Discharge: HOME | End: 2024-08-28
Payer: MEDICARE

## 2024-08-28 DIAGNOSIS — Z91.89 AT RISK FOR LYMPHEDEMA: Primary | ICD-10-CM

## 2024-08-28 DIAGNOSIS — Z17.0 MALIGNANT NEOPLASM OF UPPER-OUTER QUADRANT OF RIGHT BREAST IN FEMALE, ESTROGEN RECEPTOR POSITIVE: ICD-10-CM

## 2024-08-28 DIAGNOSIS — C50.411 MALIGNANT NEOPLASM OF UPPER-OUTER QUADRANT OF RIGHT BREAST IN FEMALE, ESTROGEN RECEPTOR POSITIVE: ICD-10-CM

## 2024-08-28 PROCEDURE — 93702 BIS XTRACELL FLUID ANALYSIS: CPT | Performed by: OCCUPATIONAL THERAPIST

## 2024-08-28 PROCEDURE — 97535 SELF CARE MNGMENT TRAINING: CPT | Performed by: OCCUPATIONAL THERAPIST

## 2024-08-28 NOTE — THERAPY RE-EVALUATION
Outpatient Occupational Therapy Lymphedema Re-Evaluation   Sean     Patient Name: Delores Kaur  : 1955  MRN: [id number]  Today's Date: 2024      Visit Date: 2024    Patient Active Problem List   Diagnosis    Thyroid nodule    Class 1 obesity due to excess calories without serious comorbidity with body mass index (BMI) of 33.0 to 33.9 in adult    Prediabetes    Dysphagia    GERD without esophagitis    Screening for malignant neoplasm of colon    Vitamin D deficiency    Mixed hyperlipidemia    Primary malignant neoplasm of central portion of right breast in female        Past Medical History:   Diagnosis Date    Broken bones     Right humerous    Cataract     Diabetes mellitus     Hypothyroidism     Obesity 1998    Osteopenia 2010    Thyroid nodule     Visual impairment contacts        Past Surgical History:   Procedure Laterality Date    BREAST LUMPECTOMY Right     with 3 lymph nodes removed.    BREAST SURGERY Left     BIOPSY    CATARACT EXTRACTION, BILATERAL       SECTION      COLONOSCOPY      COLONOSCOPY N/A 10/10/2022    Procedure: COLONOSCOPY;  Surgeon: James Camacho MD;  Location: [facility] ENDOSCOPY;  Service: General;  Laterality: N/A;  NORMAL COLONOSCOPY    ENDOSCOPY N/A 10/10/2022    Procedure: ESOPHAGOGASTRODUODENOSCOPY WITH BXS;  Surgeon: James Camacho MD;  Location: [facility] ENDOSCOPY;  Service: General;  Laterality: N/A;  ESOPHAGITIS    EYE SURGERY      retina sx    HYSTERECTOMY      TONSILLECTOMY           Visit Dx:     ICD-10-CM ICD-9-CM   1. At risk for lymphedema  Z91.89 V49.89   2. 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  Patient underwent a right-sided lumpectomy on January 29, 2024 where she had 4 lymph nodes removed.  Patient subsequently underwent 5 rounds of radiation to the right breast.  -TD         Fall Risk Assessment    Any falls in the past year: No  -TD      Does patient have a fear of falling No  -TD         Services    Are you currently receiving Home Health services No  -TD      Do you plan to receive Home Health services in the near future No  -TD         Daily Activities    Primary Language English  -TD      Are you able to read Yes  -TD      Are you able to write Yes  -TD      How does patient learn best? Listening;Reading;Demonstration;Pictures/Video  -TD         Safety    Are you being hurt, hit, or frightened by anyone at home or in your life? No  -TD      Are you being neglected by a caregiver No  -TD      Have you had any of the following issues with N/A  -TD                User Key  (r) = Recorded By, (t) = Taken By, (c) = Cosigned By      Initials Name Provider Type    TD Emmie Sheets OT Occupational Therapist                     Lymphedema       Row Name 08/28/24 0800             Subjective Pain    Able to rate subjective pain? yes  -TD      Pre-Treatment Pain Level 0  -TD      Post-Treatment Pain Level 0  -TD         Subjective    Subjective Comments Pt reports she feels well.  -TD         Lymphedema Assessment    Lymphedema Classification RUE:;at risk/stage 0  -TD      Lymphedema Cancer Related Sx right;lumpectomy;sentinel node biopsy  -TD      Lymphedema Surgery Comments 1/29/2024  -TD      Lymph Nodes Removed # 4  -TD      Positive Lymph Nodes # 0  -TD      Chemo Received no  -TD      Radiation Therapy Received yes  -TD      Radiation Treatments #/Timeframe 5  -TD         LLIS - Physical Concerns    The amount of pain associated with my lymphedema is: 0  -TD      The amount of limb heaviness associated with my lymphedema is: 0  -TD    "   The amount of skin tightness associated with my lymphedema is: 0  -TD      The size of my swollen limb(s) seems: 0  -TD      Lymphedema affects the movement of my swollen limb(s): 0  -TD      The strength in my swollen limb(s) is: 0  -TD         LLIS - Psychosocial Concerns    Lymphedema affects my body image (i.e., \"how I think I look\"). 0  -TD      Lymphedema affects my socializing with others. 0  -TD      Lymphedema affects my intimate relations with spouse or partner (rate 0 if not applicable 0  -TD      Lymphedema \"gets me down\" (i.e., depression, frustration, or anger) 0  -TD      I must rely on others for help due to my lymphedema. 0  -TD      I know what to do to manage my lymphedema 4  -TD         LLIS - Functional Concerns    Lymphedema affects my ability to perform self-care activities (i.e. eating, dressing, hygiene) 0  -TD      Lymphedema affects my ability to perform routine home or work-related activities. 0  -TD      Lymphedema affects my performance of preferred leisure activities. 0  -TD      Lymphedema affects proper fit of clothing/shoes 0  -TD      Lymphedema affects my sleep 0  -TD         Posture/Observations    Posture- WNL Posture is WNL  -TD         General ROM    GENERAL ROM COMMENTS BUE are WFL  -TD         MMT (Manual Muscle Testing)    General MMT Comments BUE are WFL  -TD         Skin Changes/Observations    Location/Assessment Upper Quadrant  -TD      Upper Quadrant Conditions right:;normal;intact;clean  -TD      Upper Quadrant Color/Pigment right:;normal  -TD      Skin Observations Comment Pt has no cords present this date and no radiation fibrosis.  -TD         L-Dex Bioimpedence Screening    L-Dex Measurement Extremity RUE  -TD      L-Dex Patient Position Standing  -TD      L-Dex UE Dominate Side Right  -TD      L-Dex UE At Risk Side Right  -TD      L-Dex UE Pre Surgical Value No  -TD      L-Dex UE Score 6.4  -TD      L-Dex UE Baseline Score 2.9  -TD      L-Dex UE Value Change " 3.5  -TD      $ L-Dex Charge yes  -TD         Lymphedema Life Impact Scale Totals    A.  Total Q1 - Q17 (Do not include Q18) 4  -TD      B.  Total number of questions answered (Q1-Q17) 17  -TD      C. Divide A by B 0.24  -TD      D. Multiple C by 25 6  -TD                User Key  (r) = Recorded By, (t) = Taken By, (c) = Cosigned By      Initials Name Provider Type    Emmie Conrad OT Occupational Therapist                    The patient had a follow up SOZO measurement which I reviewed today. The score is   WFL, see scanned to EMR. Bioimpedance spectroscopy helps identify the   onset of lymphedema in an arm or leg before patients experience noticeable swelling. Research has   shown that 92% of patients with early detection of lymphedema using L-Dex combined with   intervention do not progress to chronic lymphedema through three years. Additionally, as of March 2023, the NCCN Guidelines® for Survivorship recommend proactive screening for lymphedema using   bioimpedance spectroscopy. Whenever possible, patients are tested for baseline L-Dex score before   cancer treatment begins and then are reassessed during regular follow-up visits using the SOZO device.   Otherwise, this can be started postoperatively and continued during regular follow-up visits. If the   patient’s L-Dex score increases above normal levels, that is a sign that lymphedema is developing and a   referral is made to physical therapy for further evaluation and early compression treatment.   Lymphedema assessment with the SOZO L-Dex score is recommended to be done every 3 months for   the first 3 years and then every 6 months for years 4 and 5 followed by annually afterwards         Therapy Education  Education Details: Reviewed signs and symptoms of lymphedema and reviewed stretches this date.  Given: HEP, Symptoms/condition management, Edema management, Pain management  Program: Reinforced  How Provided: Verbal, Demonstration, Written  Level of  Understanding: Teach back education performed, Verbalized, Demonstrated  42836 - OT Self Care/Mgmt Minutes: 25         OT Goals       Row Name 08/28/24 0859          Time Calculation    OT Goal Re-Cert Due Date 09/27/24  -TD               User Key  (r) = Recorded By, (t) = Taken By, (c) = Cosigned By      Initials Name Provider Type    TD Emmie Sheets, OT Occupational Therapist                  1. Post Breast Surgery Care/at risk for Lymphedema  LTG 1: 90 days:  As an indicator of no exacerbation of lymphedema staging, the patient will present with an L-Dex score less than [10] points from preoperative baseline.              STATUS: on going  STG 1a:   30 days: To prevent exacerbation of mixed edema to lymphedema, patient will utilize the 2 postsurgical compression garments daily.                 STATUS: MET  STG 1b: 30 days: Patient will be independent with self-manual lymphatic massage.               STATUS: on going  STG 1c: 30 days:  Patient will be independent with identification of signs and symptoms of lymphedema exasperation per stoplight to recovery education handout.              STATUS: on going  STG 1 d: 30 days: Patient will be independent with HEP to prevent advancement in lymphedema staging.              STATUS: on going  TREATMENT:  Self Care/ADL retraining, Therapeutic Activity, Neuromuscular Re-education, Therapeutic Exercise, Bioimpedence Fluid Analysis, Post-Surgical compression garement [id number] [facility]/ Liliana Camisole Kit 2860K, Orthotic Management and training,  and Manual Therapy     OT Assessment/Plan       Row Name 08/28/24 0832          OT Assessment    Functional Limitations Limitations in functional capacity and performance  -TD     Impairments Impaired lymphatic circulation  -TD     Assessment Comments Karishma is a 69-year-old female with a recent diagnosis of invasive ductal carcinoma ER/MD positive HER2 negative of the right breast. Patient underwent a right-sided lumpectomy  on January 29, 2024 where she had 4 lymph nodes removed. Patient subsequently underwent 5 rounds of radiation to the right breast.  Pt l-dex score is within functional  limits this date but has slightly increased.  Pt has no AROM complaints at this time.  Patient would benefit from continued skilled occupational therapy to prevent increased staging of lymphedema, increased pain, and decreased range of motion.  -TD     OT Diagnosis at risk for lymphedema  -TD     OT Rehab Potential Good  -TD     Patient/caregiver participated in establishment of treatment plan and goals Yes  -TD     Patient would benefit from skilled therapy intervention Yes  -TD        OT Plan    OT Frequency --  see duration  -TD     Predicted Duration of Therapy Intervention (OT) Patient will be seen every 3 months years 1 through 3 and every 6 months years 4 and 5.  -TD     Planned CPT's? OT EVAL LOW COMPLEXITY: 65133;OT RE-EVAL: 74534;OT THER ACT EA 15 MIN: 81026MD;OT THER PROC EA 15 MIN: 85261SX;OT SELF CARE/MGMT/TRAIN 15 MIN: 34918;OT MANUAL THERAPY EA 15 MIN: 36623;OT BIS XTRACELL FLUID ANALYSIS: 42831;OT CARE PLAN EA 15 MIN;OT ORTHOTIC MGMT/TRAIN EA 15 MIN: 10521;OT ORTHO/PROSTHET CHECKOUT EA 15 MIN: 94672  -TD     Planned Therapy Interventions (Optional Details) home exercise program;stretching;strengthening;ROM (Range of Motion);prosthetic fitting/training;orthotic fitting/training;patient/family education  -TD     OT Plan Comments continue POC  -TD               User Key  (r) = Recorded By, (t) = Taken By, (c) = Cosigned By      Initials Name Provider Type    TD Emmie Sheets OT Occupational Therapist                              Time Calculation:   Timed Charges  75363 - OT Self Care/Mgmt Minutes: 25  Total Minutes  Timed Charges Total Minutes: 25   Total Minutes: 25     Therapy Charges for Today       Code Description Service Date Service Provider Modifiers Qty    85879369541 HC PT BIS XTRACELL FLUID ANALYSIS 8/28/2024 Emmie Sheets  OT  1    96808188582 HC OT SELF CARE/MGMT/TRAIN EA 15 MIN 8/28/2024 Emmie Sheets OT GO 2                      Emmie Sheets OT  8/28/2024

## 2024-09-17 ENCOUNTER — TRANSCRIBE ORDERS (OUTPATIENT)
Dept: ADMINISTRATIVE | Facility: HOSPITAL | Age: 69
End: 2024-09-17
Payer: MEDICARE

## 2024-09-17 DIAGNOSIS — E04.1 NONTOXIC UNINODULAR GOITER: Primary | ICD-10-CM

## 2024-10-14 ENCOUNTER — TRANSCRIBE ORDERS (OUTPATIENT)
Dept: ADMINISTRATIVE | Facility: HOSPITAL | Age: 69
End: 2024-10-14
Payer: MEDICARE

## 2024-10-14 DIAGNOSIS — C50.811 MALIGNANT NEOPLASM OF OVERLAPPING SITES OF RIGHT FEMALE BREAST, UNSPECIFIED ESTROGEN RECEPTOR STATUS: ICD-10-CM

## 2024-10-14 DIAGNOSIS — Z12.31 OTHER SCREENING MAMMOGRAM: Primary | ICD-10-CM

## 2024-10-14 DIAGNOSIS — Z98.890 HISTORY OF LUMPECTOMY: ICD-10-CM

## 2024-10-24 ENCOUNTER — HOSPITAL ENCOUNTER (OUTPATIENT)
Dept: ULTRASOUND IMAGING | Facility: HOSPITAL | Age: 69
Discharge: HOME | End: 2024-10-24
Admitting: NURSE PRACTITIONER
Payer: MEDICARE

## 2024-10-24 DIAGNOSIS — E04.1 NONTOXIC UNINODULAR GOITER: ICD-10-CM

## 2024-10-24 PROCEDURE — 76536 US EXAM OF HEAD AND NECK: CPT

## 2024-11-06 ENCOUNTER — HOSPITAL ENCOUNTER (OUTPATIENT)
Facility: HOSPITAL | Age: 69
Setting detail: THERAPIES SERIES
Discharge: HOME | End: 2024-11-06
Payer: MEDICARE

## 2024-11-06 DIAGNOSIS — C50.411 MALIGNANT NEOPLASM OF UPPER-OUTER QUADRANT OF RIGHT BREAST IN FEMALE, ESTROGEN RECEPTOR POSITIVE: ICD-10-CM

## 2024-11-06 DIAGNOSIS — Z17.0 MALIGNANT NEOPLASM OF UPPER-OUTER QUADRANT OF RIGHT BREAST IN FEMALE, ESTROGEN RECEPTOR POSITIVE: ICD-10-CM

## 2024-11-06 DIAGNOSIS — Z91.89 AT RISK FOR LYMPHEDEMA: Primary | ICD-10-CM

## 2024-11-06 PROCEDURE — 93702 BIS XTRACELL FLUID ANALYSIS: CPT

## 2024-11-06 PROCEDURE — 97535 SELF CARE MNGMENT TRAINING: CPT

## 2024-11-06 NOTE — THERAPY RE-EVALUATION
Outpatient Occupational Therapy Lymphedema Re-Evaluation   Sean     Patient Name: Delores Kaur  : 1955  MRN: [id number]  Today's Date: 2024      Visit Date: 2024    Patient Active Problem List   Diagnosis    Thyroid nodule    Class 1 obesity due to excess calories without serious comorbidity with body mass index (BMI) of 33.0 to 33.9 in adult    Prediabetes    Dysphagia    GERD without esophagitis    Screening for malignant neoplasm of colon    Vitamin D deficiency    Mixed hyperlipidemia    Primary malignant neoplasm of central portion of right breast in female        Past Medical History:   Diagnosis Date    Broken bones     Right humerous    Cataract     Diabetes mellitus     Hypothyroidism     Obesity 1998    Osteopenia 2010    Thyroid nodule     Visual impairment contacts        Past Surgical History:   Procedure Laterality Date    BREAST LUMPECTOMY Right     with 3 lymph nodes removed.    BREAST SURGERY Left     BIOPSY    CATARACT EXTRACTION, BILATERAL       SECTION      COLONOSCOPY      COLONOSCOPY N/A 10/10/2022    Procedure: COLONOSCOPY;  Surgeon: James Camacho MD;  Location: [facility];  Service: General;  Laterality: N/A;  NORMAL COLONOSCOPY    ENDOSCOPY N/A 10/10/2022    Procedure: ESOPHAGOGASTRODUODENOSCOPY WITH BXS;  Surgeon: James Camacho MD;  Location: [facility] ENDOSCOPY;  Service: General;  Laterality: N/A;  ESOPHAGITIS    EYE SURGERY      retina sx    HYSTERECTOMY      TONSILLECTOMY  1969         Visit Dx:     ICD-10-CM ICD-9-CM   1. At risk for lymphedema  Z91.89 V49.89   2. Malignant neoplasm of upper-outer quadrant of right breast in female, estrogen receptor positive  C50.411 174.4    Z17.0 V86.0        Patient History       Row Name 24 0800             History    Chief Complaint --  Lymphedema surveillance program  -SF      Brief Description of Current Complaint Karishma is a 69-year-old female with a recent diagnosis of invasive  ductal carcinoma ER/NE positive HER2 negative of the right breast.  Patient underwent a right-sided lumpectomy on January 29, 2024 where she had 4 lymph nodes removed.  Patient subsequently underwent 5 rounds of radiation to the right breast.  -SF         Fall Risk Assessment    Any falls in the past year: No  -SF      Does patient have a fear of falling No  -SF         Services    Are you currently receiving Home Health services No  -SF      Do you plan to receive Home Health services in the near future No  -SF         Daily Activities    Primary Language English  -SF      Are you able to read Yes  -SF      Are you able to write Yes  -SF      How does patient learn best? Listening;Reading;Demonstration;Pictures/Video  -SF         Safety    Are you being hurt, hit, or frightened by anyone at home or in your life? No  -SF      Are you being neglected by a caregiver No  -SF      Have you had any of the following issues with N/A  -SF                User Key  (r) = Recorded By, (t) = Taken By, (c) = Cosigned By      Initials Name Provider Type    SF Catalina Ferrari OT Occupational Therapist                     Lymphedema       Row Name 11/06/24 0800             Subjective Pain    Able to rate subjective pain? yes  -SF      Pre-Treatment Pain Level 0  -SF      Post-Treatment Pain Level 0  -SF         Subjective    Subjective Comments Patient reports she has no limitations and feels really good. Patient denies pain, swelling and decreased ROM.  -SF         Lymphedema Assessment    Lymphedema Classification RUE:;at risk/stage 0  -SF      Lymphedema Cancer Related Sx right;lumpectomy;sentinel node biopsy  -SF      Lymphedema Surgery Comments 1/26/2024  -SF      Lymph Nodes Removed # 4  -SF      Positive Lymph Nodes # 0  -SF      Chemo Received no  -SF      Radiation Therapy Received yes  -SF      Radiation Treatments #/Timeframe 5  -SF         LLIS - Physical Concerns    The amount of pain associated with my lymphedema is: 0   "-SF      The amount of limb heaviness associated with my lymphedema is: 0  -SF      The amount of skin tightness associated with my lymphedema is: 0  -SF      The size of my swollen limb(s) seems: 0  -SF      Lymphedema affects the movement of my swollen limb(s): 0  -SF      The strength in my swollen limb(s) is: 0  -SF         LLIS - Psychosocial Concerns    Lymphedema affects my body image (i.e., \"how I think I look\"). 0  -SF      Lymphedema affects my socializing with others. 0  -SF      Lymphedema affects my intimate relations with spouse or partner (rate 0 if not applicable 0  -SF      Lymphedema \"gets me down\" (i.e., depression, frustration, or anger) 0  -SF      I must rely on others for help due to my lymphedema. 0  -SF      I know what to do to manage my lymphedema 3  -SF         LLIS - Functional Concerns    Lymphedema affects my ability to perform self-care activities (i.e. eating, dressing, hygiene) 0  -SF      Lymphedema affects my ability to perform routine home or work-related activities. 0  -SF      Lymphedema affects my performance of preferred leisure activities. 0  -SF      Lymphedema affects proper fit of clothing/shoes 0  -SF      Lymphedema affects my sleep 0  -SF         Posture/Observations    Posture- WNL Posture is WNL  -SF         General ROM    GENERAL ROM COMMENTS BUE WFL  -SF         MMT (Manual Muscle Testing)    General MMT Comments BUE WFL  -SF         Skin Changes/Observations    Location/Assessment Upper Quadrant  -SF      Upper Quadrant Conditions right:;normal;intact;clean  -SF      Upper Quadrant Color/Pigment right:;normal  -SF      Skin Observations Comment Patient presents with no lymphatic cording on this date and pliable incisions w/o fibrosis.  -SF         Lymphedema Measurements    Measurement Type(s) Volumetric  -SF      Volumetric Areas Upper extremities  -SF         L-Dex Bioimpedence Screening    L-Dex Measurement Extremity RUE  -SF      L-Dex Patient Position " Standing  -SF      L-Dex UE Dominate Side Right  -SF      L-Dex UE At Risk Side Right  -SF      L-Dex UE Pre Surgical Value No  -SF      L-Dex UE Score -1.8  -SF      L-Dex UE Baseline Score 2.9  -SF      L-Dex UE Value Change -4.7  -SF      $ L-Dex Charge yes  -SF         Lymphedema Life Impact Scale Totals    A.  Total Q1 - Q17 (Do not include Q18) 3  -SF      B.  Total number of questions answered (Q1-Q17) 17  -SF      C. Divide A by B 0.18  -SF      D. Multiple C by 25 4.5  -SF                User Key  (r) = Recorded By, (t) = Taken By, (c) = Cosigned By      Initials Name Provider Type    Catalina Santos OT Occupational Therapist                    The patient had a follow up SOZO measurement which I reviewed today. The score is   WFL, see scanned to EMR. Bioimpedance spectroscopy helps identify the   onset of lymphedema in an arm or leg before patients experience noticeable swelling. Research has   shown that 92% of patients with early detection of lymphedema using L-Dex combined with   intervention do not progress to chronic lymphedema through three years. Additionally, as of March 2023, the NCCN Guidelines® for Survivorship recommend proactive screening for lymphedema using   bioimpedance spectroscopy. Whenever possible, patients are tested for baseline L-Dex score before   cancer treatment begins and then are reassessed during regular follow-up visits using the SOZO device.   Otherwise, this can be started postoperatively and continued during regular follow-up visits. If the   patient’s L-Dex score increases above normal levels, that is a sign that lymphedema is developing and a   referral is made to occupational therapy for further evaluation and early compression treatment.   Lymphedema assessment with the SOZO L-Dex score is recommended to be done every 3 months for   the first 3 years and then every 6 months for years 4 and 5 followed by annually afterwards            Therapy Education  Education  Details: Review of stoplight program.  Given: HEP, Symptoms/condition management, Edema management, Pain management  Program: Reinforced  How Provided: Verbal  Provided to: Patient  Level of Understanding: Verbalized  34209 - OT Self Care/Mgmt Minutes: 15         OT Goals       Row Name 11/06/24 0911          Time Calculation    OT Goal Re-Cert Due Date 12/06/24  -SF               User Key  (r) = Recorded By, (t) = Taken By, (c) = Cosigned By      Initials Name Provider Type    Catalina Santos OT Occupational Therapist                  1. Post Breast Surgery Care/at risk for Lymphedema  LTG 1: 90 days:  As an indicator of no exacerbation of lymphedema staging, the patient will present with an L-Dex score less than [10] points from preoperative baseline.              STATUS: Met, on going  STG 1a:   30 days: To prevent exacerbation of mixed edema to lymphedema, patient will utilize the 2 postsurgical compression garments daily.                 STATUS: MET  STG 1b: 30 days: Patient will be independent with self-manual lymphatic massage.               STATUS: on going  STG 1c: 30 days:  Patient will be independent with identification of signs and symptoms of lymphedema exasperation per stoplight to recovery education handout.              STATUS: on going  STG 1 d: 30 days: Patient will be independent with HEP to prevent advancement in lymphedema staging.              STATUS: on going  TREATMENT:  Self Care/ADL retraining, Therapeutic Activity, Neuromuscular Re-education, Therapeutic Exercise, Bioimpedence Fluid Analysis, [facility]-Surgical compression garement [address] [employer] 2860K, Orthotic Management and training,  and Manual Therapy     OT Assessment/Plan       Row Name 11/06/24 0909          OT Assessment    Functional Limitations Limitations in functional capacity and performance  -SF     Impairments Impaired lymphatic circulation  -SF     Assessment Comments Karishma is a 69-year-old female  with a recent diagnosis of invasive ductal carcinoma ER/[place] positive HER2 negative of the right breast.  Patient underwent a right lumpectomy on 1/29/2024 and had 4 lymph nodes removed.  Patient also underwent 5 rounds of radiation to the right breast.  Patient is demonstrating normalized lymphatic functioning on this date in L-Dex scores have returned down to baseline.  Patient will continue to benefit from skilled occupational therapy to further evaluate ongoing lymphatic functioning to prevent advancement in lymphedema staging, increased pain and decreased range of motion.  -SF     OT Diagnosis At risk for lymphedema  -SF     OT Rehab Potential Good  -SF     Patient/caregiver participated in establishment of treatment plan and goals Yes  -SF     Patient would benefit from skilled therapy intervention Yes  -SF        OT Plan    OT Frequency Other (comment)  -SF     Predicted Duration of Therapy Intervention (OT) Patient will be reevaluated every 3 months years 1 through 3 and every 6 months years 4 and 5  -SF     Planned CPT's? OT EVAL LOW COMPLEXITY: 53315;OT RE-EVAL: 02542;OT THER ACT EA 15 MIN: [id number];OT THER PROC EA 15 MIN: 31106WK;OT SELF CARE/MGMT/TRAIN 15 MIN: 51863;OT MANUAL THERAPY EA 15 MIN: 67721;OT BIS XTRACELL FLUID ANALYSIS: 30618;OT CARE PLAN EA 15 MIN;OT ORTHOTIC MGMT/TRAIN EA 15 MIN: 86624;OT ORTHO/PROSTHET CHECKOUT EA 15 MIN: 86894  -SF     Planned Therapy Interventions (Optional Details) home exercise program;stretching;strengthening;ROM (Range of Motion);prosthetic fitting/training;orthotic fitting/training;patient/family education  -SF     OT Plan Comments Continue POC  -SF               User Key  (r) = Recorded By, (t) = Taken By, (c) = Cosigned By      Initials Name Provider Type    SF Catalina Ferrari OT Occupational Therapist                              Time Calculation:   Timed Charges  83536 - OT Self Care/Mgmt Minutes: 15  Total Minutes  Timed Charges Total Minutes: 15   Total Minutes: 15      Therapy Charges for Today       Code Description Service Date Service Provider Modifiers Qty    11379104159 HC PT BIS XTRACELL FLUID ANALYSIS 11/6/2024 Catalina Ferrari OT  1    [id number] HC OT SELF CARE/MGMT/TRAIN EA 15 MIN 11/6/2024 Catalina Ferrari OT GO 1                      Catalina Ferrari OT  11/6/2024

## 2024-11-11 ENCOUNTER — HOSPITAL ENCOUNTER (OUTPATIENT)
Dept: ULTRASOUND IMAGING | Facility: HOSPITAL | Age: 69
Discharge: HOME | End: 2024-11-11
Payer: MEDICARE

## 2024-11-11 ENCOUNTER — HOSPITAL ENCOUNTER (OUTPATIENT)
Dept: MAMMOGRAPHY | Facility: HOSPITAL | Age: 69
Discharge: HOME | End: 2024-11-11
Payer: MEDICARE

## 2024-11-11 DIAGNOSIS — C50.811 MALIGNANT NEOPLASM OF OVERLAPPING SITES OF RIGHT FEMALE BREAST, UNSPECIFIED ESTROGEN RECEPTOR STATUS: ICD-10-CM

## 2024-11-11 DIAGNOSIS — Z98.890 HISTORY OF LUMPECTOMY: ICD-10-CM

## 2024-11-11 PROCEDURE — G0279 TOMOSYNTHESIS, MAMMO: HCPCS

## 2024-11-11 PROCEDURE — 77066 DX MAMMO INCL CAD BI: CPT

## 2024-11-22 ENCOUNTER — OFFICE VISIT (OUTPATIENT)
Dept: RADIATION ONCOLOGY | Facility: HOSPITAL | Age: 69
End: 2024-11-22
Payer: MEDICARE

## 2024-11-22 VITALS
DIASTOLIC BLOOD PRESSURE: 68 MMHG | BODY MASS INDEX: 32.17 KG/M2 | RESPIRATION RATE: 16 BRPM | SYSTOLIC BLOOD PRESSURE: 122 MMHG | OXYGEN SATURATION: 100 % | WEIGHT: 187.39 LBS | TEMPERATURE: 97.3 F | HEART RATE: 67 BPM

## 2024-11-22 DIAGNOSIS — Z08 ENCOUNTER FOR FOLLOW-UP SURVEILLANCE OF BREAST CANCER: ICD-10-CM

## 2024-11-22 DIAGNOSIS — Z17.0 MALIGNANT NEOPLASM OF CENTRAL PORTION OF RIGHT BREAST IN FEMALE, ESTROGEN RECEPTOR POSITIVE: Primary | ICD-10-CM

## 2024-11-22 DIAGNOSIS — Z85.3 ENCOUNTER FOR FOLLOW-UP SURVEILLANCE OF BREAST CANCER: ICD-10-CM

## 2024-11-22 DIAGNOSIS — C50.111 MALIGNANT NEOPLASM OF CENTRAL PORTION OF RIGHT BREAST IN FEMALE, ESTROGEN RECEPTOR POSITIVE: Primary | ICD-10-CM

## 2024-11-22 DIAGNOSIS — Z92.3 HISTORY OF EXTERNAL BEAM RADIATION THERAPY: ICD-10-CM

## 2024-11-22 DIAGNOSIS — Z91.89 AT RISK FOR LYMPHEDEMA: ICD-10-CM

## 2024-11-22 PROCEDURE — G0463 HOSPITAL OUTPT CLINIC VISIT: HCPCS | Performed by: NURSE PRACTITIONER

## 2024-11-22 NOTE — PROGRESS NOTES
Follow Up Office Visit      Encounter Date: 11/22/2024   Patient Name: Delores Kaur  YOB: 1955   Medical Record Number: [id number]   Primary Diagnosis: Malignant neoplasm of central portion of right breast in female, estrogen receptor positive [C50.111, Z17.0]     Cancer Staging   Primary malignant neoplasm of central portion of right breast in female  Staging form: Breast, AJCC 8th Edition  - Clinical stage from 12/6/2023: Stage IA (cT1c, cN0, cM0, G1, ER+, AR+, HER2-) - Signed by Sharifa Harris APRN on 5/16/2024  - Pathologic stage from 1/29/2024: Stage IA (pT1c, pN0(sn), cM0, G1, ER+, AR+, HER2-, Oncotype DX score: 12) - Signed by Sharifa Harris APRN on 5/17/2024    Radiation Completion Date:  4/3/2024     Chief Complaint:    Chief Complaint   Patient presents with    Follow-up    Breast Cancer       Oncology/Hematology History   Primary malignant neoplasm of central portion of right breast in female   12/6/2023 Cancer Staged    Staging form: Breast, AJCC 8th Edition  - Clinical stage from 12/6/2023: Stage IA (cT1c, cN0, cM0, G1, ER+, AR+, HER2-) - Signed by Sharifa Harris APRN on 5/16/2024 1/29/2024 Cancer Staged    Staging form: Breast, AJCC 8th Edition  - Pathologic stage from 1/29/2024: Stage IA (pT1c, pN0(sn), cM0, G1, ER+, AR+, HER2-, Oncotype DX score: 12) - Signed by Sharifa Harris APRN on 5/17/2024     3/15/2024 Initial Diagnosis    Primary malignant neoplasm of central portion of right breast in female     3/25/2024 - 4/3/2024 Radiation    Radiation OncologyTreatment Course:  Delores Kaur received 3000 cGy in 5 fractions via APBI to the right breast.          History of Present Illness: Delores Kaur is a 69 y.o. female who returns to [facility] for routine follow-up of her breast cancer. She presents today with her . She reports feeling well overall with no new complaints or concerns. She has no breast related concerns today although right breast scar is still  sensitive but is improving. Denies breast pain or tenderness, palpable masses, nipple changes or nipple discharge, limited range of motion in upper extremity. She is anastrozole per her medical oncologist in [place] and is tolerating well. She is actively followed by Lymphedema Therapy. Her screening mammogram on 11/11/24 is benign.     Subjective      Review of Systems: Review of Systems   Constitutional:  Negative for activity change, appetite change, chills, fatigue, fever and unexpected weight change.   HENT:  Negative for hearing loss, sore throat and trouble swallowing.    Eyes:  Positive for visual disturbance (Improved after cataract surgery but feels like it is worsening.).   Respiratory:  Negative for cough, chest tightness, shortness of breath and wheezing.    Cardiovascular:  Negative for chest pain, palpitations and leg swelling.   Gastrointestinal:  Negative for anal bleeding, blood in stool, constipation, diarrhea, nausea and vomiting.   Endocrine: Negative for heat intolerance.   Genitourinary:  Negative for difficulty urinating, dysuria, frequency, hematuria and urgency.   Musculoskeletal:  Positive for arthralgias (Noted in random areas, noted after starting anastrazole.). Negative for back pain, gait problem and joint swelling.        States that right breast incision is sensitive, ongoing but improving.  Normal ROM in right arm.  No pain, swelling or numbness noted to right breast, axilla or right arm.   Skin:  Negative for color change, rash and wound.   Neurological:  Negative for dizziness, weakness and headaches.   Psychiatric/Behavioral:  Positive for sleep disturbance (Wakes throughout the night, ongoing).        The following portions of the patient's history were reviewed and updated as appropriate: allergies, current medications, past family history, past medical history, past social history, past surgical history and problem list.    Medications:     Current Outpatient Medications:      Anastrozole (ARIMIDEX PO), Take 1 tablet by mouth Daily., Disp: , Rfl:     Cholecalciferol (Vitamin D3) 25 MCG (1000 UT) capsule, Take 1 capsule by mouth Daily. Takes when she remembers., Disp: , Rfl:     Semaglutide,0.25 or 0.5MG/DOS, (Ozempic, 0.25 or 0.5 MG/DOSE,) 2 MG/1.5ML solution pen-injector, Inject 0.5 mg under the skin into the appropriate area as directed 1 (One) Time Per Week., Disp: 4.5 mL, Rfl: 1    Calcium Carbonate (CALCIUM 500 PO), Take 500 mg by mouth Daily. (Patient not taking: Reported on 2024), Disp: , Rfl:     FIBER PO, Take  by mouth Daily. (Patient not taking: Reported on 2024), Disp: , Rfl:     tretinoin (RETIN-A) 0.025 % cream, APPLY A PEA-SIZED AMOUNT TO FACE AT BEDTIME (Patient not taking: Reported on 2024), Disp: , Rfl:     Allergies:   No Known Allergies    Patient Smoking History:   Social History     Tobacco Use   Smoking Status Former    Current packs/day: 0.00    Average packs/day: 1.5 packs/day for 23.3 years (35.0 ttl pk-yrs)    Types: Cigarettes    Start date: 1976    Quit date: 2000    Years since quittin.9   Smokeless Tobacco Never   Tobacco Comments    smoked for about 20 years, quit since about        Measures:  PHQ-9 Total Score: 2   Quality of Life: 100 - Full Activity   ECOG score: 0  ECOG: (0) Fully active, able to carry on all predisease performance without restriction  Pain: (on a scale of 0-10)   Pain Score    24 1056   PainSc: 0-No pain       Objective     Physical Exam:   Vital Signs:   Vitals:    24 1056   BP: 122/68   Pulse: 67   Resp: 16   Temp: 97.3 °F (36.3 °C)   TempSrc: Temporal   SpO2: 100%   Weight: 85 kg (187 lb 6.3 oz)   PainSc: 0-No pain     Body mass index is 32.17 kg/m².   Wt Readings from Last 3 Encounters:   24 85 kg (187 lb 6.3 oz)   05/17/24 87.4 kg (192 lb 10.9 oz)   24 89.6 kg (197 lb 8.5 oz)       Physical Exam  Vitals reviewed.   Constitutional:       General: She is not in acute  distress.     Appearance: Normal appearance. She is normal weight. She is not ill-appearing.   HENT:      Head: Normocephalic and atraumatic.      Mouth/Throat:      Mouth: Mucous membranes are moist.      Pharynx: Oropharynx is clear. No oropharyngeal exudate or posterior oropharyngeal erythema.   Eyes:      Conjunctiva/sclera: Conjunctivae normal.      Pupils: Pupils are equal, round, and reactive to light.   Cardiovascular:      Rate and Rhythm: Normal rate and regular rhythm.      Pulses: Normal pulses.      Heart sounds: Normal heart sounds.   Pulmonary:      Effort: Pulmonary effort is normal. No respiratory distress.      Breath sounds: Normal breath sounds.   Chest:   Breasts:     Right: No swelling, bleeding, inverted nipple, mass, nipple discharge, skin change or tenderness.      Left: No swelling, bleeding, inverted nipple, mass, nipple discharge, skin change or tenderness.       Musculoskeletal:         General: Normal range of motion.      Cervical back: Normal range of motion.      Comments: Full AROM RUE   Lymphadenopathy:      Upper Body:      Right upper body: No supraclavicular or axillary adenopathy.      Left upper body: No supraclavicular or axillary adenopathy.   Skin:     General: Skin is warm and dry.   Neurological:      General: No focal deficit present.      Mental Status: She is alert and oriented to person, place, and time. Mental status is at baseline.   Psychiatric:         Mood and Affect: Mood normal.         Behavior: Behavior normal.       Result Review: I independently reviewed the following data.   -- Mammo Diagnostic Digital Tomosynthesis Bilateral With CAD (11/11/2024 10:09)   -- US Thyroid (10/24/2024 10:01)     Pathology:   Lab Results   Component Value Date    CLININFO  12/06/2023     Right breast mass      FINALDX  12/06/2023     Right breast,  6 o'clock position, 3 cm from nipple, ultrasound-guided core biopsy:  - Invasive carcinoma of no special type (ductal)  -  Histologic grade (Critz Histologic Score):    - Glandular (Acinar)/Tubular Differentiation:  Score 2  - Nuclear Pleomorphism:  Score 1  - Mitotic Rate:  Score 1  - Overall Grade:  Grade 1   - Size of largest focus of invasion:  10 mm   - Breast biomarker testing:    - Estrogen Receptor (ER):  Positive (95%, strong)    - Progesterone Receptor (PgR):  Positive (90%, strong)    - HER2 (by immunohistochemistry):  Equivocal (Score 2+), see comment  - Ki-67: 10%   - Other findings:      - 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    REMARKS: The above positive (malignant) diagnosis was called to Gloria KEITH in Dr. Hudson's office at 15:19 EST on 23 by Festus Joseph,  and also to STEPHANIE NoelN, RN, designee for Dr. Avalos at 08:35 EST on 2023 by Festus Joseph.       SYNOPTIC  2023     Breast Biomarker Reporting Template  BREAST: BIOMARKER REPORTING TEMPLATE - All Specimens  Protocol posted: 3/22/2023       Test(s) Performed:           Estrogen Receptor (ER) Status:    Positive (greater than 10% of cells demonstrate nuclear positivity)          Percentage of Cells with Nuclear Positivity:    95 %         Average Intensity of Staining:    Strong        Test Type:    Food and Drug Administration (FDA) cleared (test / vendor): Roche        Primary Antibody:    SP1      Test(s) Performed:           Progesterone Receptor (PgR) Status:    Positive          Percentage of Cells with Nuclear Positivity:    90 %         Average Intensity of Staining:    Strong        Test Type:    Food and Drug Administration (FDA) cleared (test / vendor): Roche        Primary Antibody:    1E2      Test(s) Performed:           HER2 by Immunohistochemistry:    Equivocal (Score 2+)          Percentage of Cells with Uniform Intense Complete Membrane Stainin  %       Test Type:    Food and Drug Administration (FDA) cleared (test / vendor): Roche        Primary Antibody:    4B5      Test(s) Performed:    Ki-67        Ki-67 Percentage of Positive Nuclei:    10 %       Primary Antibody:    30-9      Cold Ischemia and Fixation Times:    Meet requirements specified in latest version of the ASCO / CAP Guidelines        Imaging: Mammo Diagnostic Digital Tomosynthesis Bilateral With CAD    Result Date: 11/11/2024  Right breast: Chronic post lumpectomy and posttreatment changes. No suspicious interval changes. Recommend routine right breast screening.  Left breast: Benign mammogram. Recommend routine left breast screening Tissue Density:  The breasts are heterogenously dense, which may obscure small masses.  BI-RADS ASSESSMENT: BI-RADS 2. Benign findings.   The patient's information is entered into a computerized reminder system with a targeted due date for the next mammogram.  Note:  It has been reported that there is approximately a 15% false negative in mammography.  Therefore, management of a palpable abnormality should not be deferred because of a negative mammogram.            Electronically Signed By-YANIRA DIGGS MD On:11/11/2024 10:36 AM      US Thyroid    Result Date: 10/24/2024  Impression: 1. Stable bilateral TI-RADS 4 nodules. TI-RADS: TR4 - Size between 1 cm and 1.5 cm. Recommend follow up thyroid ultrasound at years 1, 2, 3 and 5 of surveillance. Electronically Signed: Marcos Palma MD  10/24/2024 8:33 PM EDT  Workstation ID: [id number]      Labs:   WBC   Date Value Ref Range Status   03/21/2024 5.99 4.5 - 11.0 10*3/uL Final     Hemoglobin   Date Value Ref Range Status   03/21/2024 13.9 12.0 - 16.0 g/dL Final     Hematocrit   Date Value Ref Range Status   03/21/2024 41.9 36.0 - 46.0 % Final     Platelets   Date Value Ref Range Status   03/21/2024 300 140 - 440 10*3/uL Final     Creatinine   Date Value Ref Range Status   08/24/2023 0.71 0.57 - 1.00 mg/dL Final      BUN   Date Value Ref Range Status   08/24/2023 10 8 - 23 mg/dL Final     eGFR   Date Value Ref Range Status   08/24/2023 93.9 >60.0 mL/min/1.73 Final     TSH   Date Value Ref Range Status   08/24/2023 0.340 0.270 - 4.200 uIU/mL Final     Assessment / Plan      Impression: Delores Kaur is a pleasant 69 y.o. female with cT1c cN0 cM0 invasive ductal carcinoma of the right breast, grade 1, ER/NE positive, HER2 equivocal by IHC, negative by FISH with Ki-67 of 10%. Oncotype DX score of 12. She is status post right breast lumpectomy and SLNB on 1/29/2024 with Dr. Andersen at [facility]; pathology pT1c pN0. All margins negative. A total of 4 sentinel lymph nodes were removed and all were negative for carcinoma. She is status post accelerated partial breast irradiation to the right breast, completed on 4/3/2024. Received 3000 cGy in 5 fractions. She is doing well overall and remains without evidence of disease clinically. Her screening mammogram performed on 11/11/2024 reveals no radiographic evidence of disease; BI-RADS 2. She is taking anastrozole and is tolerating well.     Assessment/Plan:   Diagnoses and all orders for this visit:    1. Malignant neoplasm of central portion of right breast in female, estrogen receptor positive (Primary)    2. History of external beam radiation therapy    3. Encounter for follow-up surveillance of breast cancer    4. At risk for lymphedema       Follow Up:   Return for follow-up in 1 year.   Recommend continuation of annual screening mammogram; next mammography due in November 2025.  Follow-up with Lymphedema Therapy on 2/4/25.   Follow-up with Dr. Mccullough at [facility] as scheduled.      Return in about 1 year (around 11/22/2025) for Office Visit.  Delores Kaur was encouraged to contact me in the interim with any questions or concerns regarding her care.      SALVADOR Bhardwaj  Radiation Oncology  [facility]    This document has been signed by SALVADOR Dubois on  November 22, 2024 11:51 EST

## 2025-02-24 ENCOUNTER — TELEPHONE (OUTPATIENT)
Facility: HOSPITAL | Age: 70
End: 2025-02-24
Payer: MEDICARE

## 2025-02-24 NOTE — TELEPHONE ENCOUNTER
Patient called Complex care trying to reach Catalina Ferrari. Please return call to patient at your earliest convenience.

## 2025-03-13 ENCOUNTER — HOSPITAL ENCOUNTER (OUTPATIENT)
Facility: HOSPITAL | Age: 70
Setting detail: THERAPIES SERIES
Discharge: HOME | End: 2025-03-13
Payer: MEDICARE

## 2025-03-13 DIAGNOSIS — Z17.0 MALIGNANT NEOPLASM OF UPPER-OUTER QUADRANT OF RIGHT BREAST IN FEMALE, ESTROGEN RECEPTOR POSITIVE: ICD-10-CM

## 2025-03-13 DIAGNOSIS — C50.411 MALIGNANT NEOPLASM OF UPPER-OUTER QUADRANT OF RIGHT BREAST IN FEMALE, ESTROGEN RECEPTOR POSITIVE: ICD-10-CM

## 2025-03-13 DIAGNOSIS — Z91.89 AT RISK FOR LYMPHEDEMA: Primary | ICD-10-CM

## 2025-03-13 PROCEDURE — 97535 SELF CARE MNGMENT TRAINING: CPT

## 2025-03-13 PROCEDURE — 93702 BIS XTRACELL FLUID ANALYSIS: CPT

## 2025-03-13 NOTE — THERAPY RE-EVALUATION
Outpatient Occupational Therapy Lymphedema Re-Evaluation   Sean     Patient Name: Delores Kaur  : 1955  MRN: [id number]  Today's Date: 3/13/2025      Visit Date: 2025    Patient Active Problem List   Diagnosis    Thyroid nodule    Class 1 obesity due to excess calories without serious comorbidity with body mass index (BMI) of 33.0 to 33.9 in adult    Prediabetes    Dysphagia    GERD without esophagitis    Screening for malignant neoplasm of colon    Vitamin D deficiency    Mixed hyperlipidemia    Primary malignant neoplasm of central portion of right breast in female        Past Medical History:   Diagnosis Date    Broken bones     Right humerous    Cataract     Diabetes mellitus     Hypothyroidism     Obesity 1998    Osteopenia 2010    Thyroid nodule     Visual impairment contacts        Past Surgical History:   Procedure Laterality Date    BREAST LUMPECTOMY Right     with 3 lymph nodes removed.    BREAST SURGERY Left     BIOPSY    CATARACT EXTRACTION, BILATERAL       SECTION      COLONOSCOPY      COLONOSCOPY N/A 10/10/2022    Procedure: COLONOSCOPY;  Surgeon: James Camacho MD;  Location: [facility] ENDOSCOPY;  Service: General;  Laterality: N/A;  NORMAL COLONOSCOPY    ENDOSCOPY N/A 10/10/2022    Procedure: ESOPHAGOGASTRODUODENOSCOPY WITH BXS;  Surgeon: James Camacho MD;  Location: [facility] ENDOSCOPY;  Service: General;  Laterality: N/A;  ESOPHAGITIS    EYE SURGERY      retina sx    HYSTERECTOMY      TONSILLECTOMY           Visit Dx:     ICD-10-CM ICD-9-CM   1. At risk for lymphedema  Z91.89 V49.89   2. 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  Patient underwent a right-sided lumpectomy on January 29, 2024 where she had 4 lymph nodes removed.  Patient subsequently underwent 5 rounds of radiation to the right breast.  -         Fall Risk Assessment    Any falls in the past year: No  -MH      Does patient have a fear of falling No  -         Services    Are you currently receiving Home Health services No  -MH      Do you plan to receive Home Health services in the near future No  -         Daily Activities    Primary Language English  -      Are you able to read Yes  -      Are you able to write Yes  -      How does patient learn best? Listening;Reading;Demonstration;Pictures/Video  -         Safety    Are you being hurt, hit, or frightened by anyone at home or in your life? No  -      Are you being neglected by a caregiver No  -      Have you had any of the following issues with N/A  -                User Key  (r) = Recorded By, (t) = Taken By, (c) = Cosigned By      Initials Name Provider Type     Liliana Brown OT Occupational Therapist                     Lymphedema       Row Name 03/13/25 0900             Subjective Pain    Able to rate subjective pain? yes  -      Pre-Treatment Pain Level 0  -      Post-Treatment Pain Level 0  -         Subjective    Subjective Comments Patient reports that she has no pain or tightness and has not felt limited.  -         Lymphedema Assessment    Lymphedema Classification RUE:;at risk/stage 0  -      Lymphedema Cancer Related Sx right;lumpectomy;sentinel node biopsy  -      Lymphedema Surgery Comments 1/2024  -      Lymph Nodes Removed # 4  -      Positive Lymph Nodes # 0  -      Chemo Received no  -      Radiation Therapy Received yes  -      Radiation Treatments #/Timeframe 5  -         LLIS - Physical Concerns    The amount of pain associated with my lymphedema is: 0  -      The amount of limb  "heaviness associated with my lymphedema is: 0  -MH      The amount of skin tightness associated with my lymphedema is: 0  -MH      The size of my swollen limb(s) seems: 0  -MH      Lymphedema affects the movement of my swollen limb(s): 0  -MH      The strength in my swollen limb(s) is: 0  -MH         LLIS - Psychosocial Concerns    Lymphedema affects my body image (i.e., \"how I think I look\"). 0  -MH      Lymphedema affects my socializing with others. 0  -MH      Lymphedema affects my intimate relations with spouse or partner (rate 0 if not applicable 0  -MH      Lymphedema \"gets me down\" (i.e., depression, frustration, or anger) 0  -MH      I must rely on others for help due to my lymphedema. 0  -MH      I know what to do to manage my lymphedema 3  -MH         LLIS - Functional Concerns    Lymphedema affects my ability to perform self-care activities (i.e. eating, dressing, hygiene) 0  -MH      Lymphedema affects my ability to perform routine home or work-related activities. 0  -MH      Lymphedema affects my performance of preferred leisure activities. 0  -MH      Lymphedema affects proper fit of clothing/shoes 0  -MH      Lymphedema affects my sleep 0  -MH         Posture/Observations    Posture- WNL Posture is WNL  -         General ROM    GENERAL ROM COMMENTS BUE WFL  -MH         MMT (Manual Muscle Testing)    General MMT Comments BUE WFL  -MH         Skin Changes/Observations    Location/Assessment Upper Quadrant  -      Upper Quadrant Conditions right:;normal;intact;clean  -      Upper Quadrant Color/Pigment right:;normal  -      Skin Observations Comment Patient has minimal fibrosis around bottom of breast  -MH         L-Dex Bioimpedence Screening    L-Dex Measurement Extremity RUE  -MH      L-Dex Patient Position Standing  -      L-Dex UE Dominate Side Right  -      L-Dex UE At Risk Side Right  -      L-Dex UE Pre Surgical Value No  -      L-Dex UE Score 2  -MH      L-Dex UE Baseline Score " 2.9  -MH      L-Dex UE Value Change -0.9  -MH      $ L-Dex Charge yes  -MH         Lymphedema Life Impact Scale Totals    A.  Total Q1 - Q17 (Do not include Q18) 3  -MH      B.  Total number of questions answered (Q1-Q17) 17  -MH      C. Divide A by B 0.18  -MH      D. Multiple C by 25 4.5  -MH                User Key  (r) = Recorded By, (t) = Taken By, (c) = Cosigned By      Initials Name Provider Type    Liliana Mike OT Occupational Therapist                    The patient had a follow up SOZO measurement which I reviewed today. The score is   WFL, see scanned to EMR. Bioimpedance spectroscopy helps identify the   onset of lymphedema in an arm or leg before patients experience noticeable swelling. Research has   shown that 92% of patients with early detection of lymphedema using L-Dex combined with   intervention do not progress to chronic lymphedema through three years. Additionally, as of March 2023, the NCCN Guidelines® for Survivorship recommend proactive screening for lymphedema using   bioimpedance spectroscopy. Whenever possible, patients are tested for baseline L-Dex score before   cancer treatment begins and then are reassessed during regular follow-up visits using the SOZO device.   Otherwise, this can be started postoperatively and continued during regular follow-up visits. If the   patient’s L-Dex score increases above normal levels, that is a sign that lymphedema is developing and a   referral is made to occupational therapy for further evaluation and early compression treatment.   Lymphedema assessment with the SOZO L-Dex score is recommended to be done every 3 months for   the first 3 years and then every 6 months for years 4 and 5 followed by annually afterwards            Therapy Education  Education Details: Educated on HEP for exercising and identifying s/s of lymphedema.  Given: HEP, Symptoms/condition management, Edema management, Pain management  Program: Reinforced  How Provided:  Verbal  Provided to: Patient  Level of Understanding: Verbalized  84826 - OT Self Care/Mgmt Minutes: 15         OT Goals       Row Name 03/13/25 0927          Time Calculation    OT Goal Re-Cert Due Date 04/12/25  -               User Key  (r) = Recorded By, (t) = Taken By, (c) = Cosigned By      Initials Name Provider Type     Liliana Brown, OT Occupational Therapist                  1. Post Breast Surgery Care/at risk for Lymphedema  LTG 1: 90 days:  As an indicator of no exacerbation of lymphedema staging, the patient will present with an L-Dex score less than [10] points from preoperative baseline.              STATUS: Met, on going  STG 1a:   30 days: To prevent exacerbation of mixed edema to lymphedema, patient will utilize the 2 postsurgical compression garments daily.                 STATUS: MET  STG 1b: 30 days: Patient will be independent with self-manual lymphatic massage.               STATUS: on going  STG 1c: 30 days:  Patient will be independent with identification of signs and symptoms of lymphedema exasperation per stoplight to recovery education handout.              STATUS: on going  STG 1 d: 30 days: Patient will be independent with HEP to prevent advancement in lymphedema staging.              STATUS: on going  TREATMENT:  Self Care/ADL retraining, Therapeutic Activity, Neuromuscular Re-education, Therapeutic Exercise, Bioimpedence Fluid Analysis, Post-Surgical compression garement [id number] [facility] [place] Cam[employer]ole Kit 2860K, Orthotic Management and training,  and Manual Therapy     OT Assessment/Plan       Row Name 03/13/25 0925          OT Assessment    Functional Limitations Limitations in functional capacity and performance  -     Impairments Impaired lymphatic circulation  -     Assessment Comments Karishma is a 69-year-old female with a diagnosis of invasive ductal carcinoma ER/SC positive HER2 negative of the right breast. Patient underwent a right lumpectomy on 1/29/2024  and had 4 lymph nodes removed. Patient also underwent 5 rounds of radiation to the right breast. Patient is demonstrating normalized lymphatic functioning on this date in L-Dex scores are WFL. Pt reports she has not felt limited and has been gardening. Patient will continue to benefit from skilled occupational therapy to further evaluate ongoing lymphatic functioning to prevent advancement in lymphedema staging, increased pain and decreased range of motion.  -     Please refer to paper survey for additional self-reported information Yes  -     OT Diagnosis at risk of lymphedema  -     OT Rehab Potential Good  -     Patient/caregiver participated in establishment of treatment plan and goals Yes  -        OT Plan    OT Frequency Other (comment)  see duration  -     Predicted Duration of Therapy Intervention (OT) Patient will be reevaluated every 3 months years 1 through 3 and every 6 months years 4 and 5  -     Planned CPT's? OT EVAL LOW COMPLEXITY: 90270;OT RE-EVAL: 06266;OT THER ACT EA 15 MIN: 31451MF;OT THER PROC EA 15 MIN: 45042EF;OT SELF CARE/MGMT/TRAIN 15 MIN: 48750;OT MANUAL THERAPY EA 15 MIN: 63912;OT BIS XTRACELL FLUID ANALYSIS: 24777;OT CARE PLAN EA 15 MIN;OT ORTHOTIC MGMT/TRAIN EA 15 MIN: 23565;OT ORTHO/PROSTHET CHECKOUT EA 15 MIN: 73159  -     Planned Therapy Interventions (Optional Details) home exercise program;stretching;strengthening;ROM (Range of Motion);prosthetic fitting/training;orthotic fitting/training;patient/family education  -     OT Plan Comments continue POC  -               User Key  (r) = Recorded By, (t) = Taken By, (c) = Cosigned By      Initials Name Provider Type     Liliana Brown OT Occupational Therapist                              Time Calculation:   Timed Charges  65855 - OT Self Care/Mgmt Minutes: 15  Total Minutes  Timed Charges Total Minutes: 15   Total Minutes: 15     Therapy Charges for Today       Code Description Service Date Service Provider  Modifiers Qty    95784628230 HC PT BIS XTRACELL FLUID ANALYSIS 3/13/2025 Liliana Brown OT  1    68996618097 HC OT SELF CARE/MGMT/TRAIN EA 15 MIN 3/13/2025 Liliana Brown OT GO 1                      Liliana Brown OT  3/13/2025

## 2025-06-17 ENCOUNTER — HOSPITAL ENCOUNTER (OUTPATIENT)
Facility: HOSPITAL | Age: 70
Setting detail: THERAPIES SERIES
Discharge: HOME | End: 2025-06-17
Payer: MEDICARE

## 2025-06-17 DIAGNOSIS — Z91.89 AT RISK FOR LYMPHEDEMA: Primary | ICD-10-CM

## 2025-06-17 DIAGNOSIS — C50.411 MALIGNANT NEOPLASM OF UPPER-OUTER QUADRANT OF RIGHT BREAST IN FEMALE, ESTROGEN RECEPTOR POSITIVE: ICD-10-CM

## 2025-06-17 DIAGNOSIS — Z17.0 MALIGNANT NEOPLASM OF UPPER-OUTER QUADRANT OF RIGHT BREAST IN FEMALE, ESTROGEN RECEPTOR POSITIVE: ICD-10-CM

## 2025-06-17 PROCEDURE — 93702 BIS XTRACELL FLUID ANALYSIS: CPT

## 2025-06-17 PROCEDURE — 97535 SELF CARE MNGMENT TRAINING: CPT

## 2025-06-17 NOTE — THERAPY RE-EVALUATION
Outpatient Occupational Therapy Lymphedema Re-Evaluation   Sean     Patient Name: Delores Kaur  : 1955  MRN: [id number]  Today's Date: 2025      Visit Date: 2025    Patient Active Problem List   Diagnosis    Thyroid nodule    Class 1 obesity due to excess calories without serious comorbidity with body mass index (BMI) of 33.0 to 33.9 in adult    Prediabetes    Dysphagia    GERD without esophagitis    Screening for malignant neoplasm of colon    Vitamin D deficiency    Mixed hyperlipidemia    Primary malignant neoplasm of central portion of right breast in female        Past Medical History:   Diagnosis Date    Broken bones     Right humerous    Cataract     Diabetes mellitus     Hypothyroidism     Obesity 1998    Osteopenia 2010    Thyroid nodule     Visual impairment contacts        Past Surgical History:   Procedure Laterality Date    BREAST LUMPECTOMY Right     with 3 lymph nodes removed.    BREAST SURGERY Left     BIOPSY    CATARACT EXTRACTION, BILATERAL       SECTION      COLONOSCOPY      COLONOSCOPY N/A 10/10/2022    Procedure: COLONOSCOPY;  Surgeon: James Camacho MD;  Location: [facility] ENDOSCOPY;  Service: General;  Laterality: N/A;  NORMAL COLONOSCOPY    ENDOSCOPY N/A 10/10/2022    Procedure: ESOPHAGOGASTRODUODENOSCOPY WITH BXS;  Surgeon: James Camacho MD;  Location: [facility] ENDOSCOPY;  Service: General;  Laterality: N/A;  ESOPHAGITIS    EYE SURGERY      retina sx    HYSTERECTOMY      TONSILLECTOMY           Visit Dx:     ICD-10-CM ICD-9-CM   1. At risk for lymphedema  Z91.89 V49.89   2. Malignant neoplasm of upper-outer quadrant of right breast in female, estrogen receptor positive  C50.411 174.4    Z17.0 V86.0        Patient History       Row Name 25 0900             History    Chief Complaint --  Lymphedema surveillance program  -      Brief Description of Current Complaint Karishma is a 70-year-old female with a recent diagnosis of invasive  ductal carcinoma ER/OH positive HER2 negative of the right breast.  Patient underwent a right-sided lumpectomy on January 29, 2024 where she had 4 lymph nodes removed.  Patient subsequently underwent 5 rounds of radiation to the right breast.  -         Fall Risk Assessment    Any falls in the past year: No  -MH      Does patient have a fear of falling No  -         Services    Are you currently receiving Home Health services No  -MH      Do you plan to receive Home Health services in the near future No  -         Daily Activities    Primary Language English  -      Are you able to read Yes  -      Are you able to write Yes  -      How does patient learn best? Listening;Reading;Demonstration;Pictures/Video  -         Safety    Are you being hurt, hit, or frightened by anyone at home or in your life? No  -MH      Are you being neglected by a caregiver No  -      Have you had any of the following issues with N/A  -                User Key  (r) = Recorded By, (t) = Taken By, (c) = Cosigned By      Initials Name Provider Type     Liliana Brown OT Occupational Therapist                     Lymphedema       Row Name 06/17/25 0900             Subjective Pain    Able to rate subjective pain? yes  -      Pre-Treatment Pain Level 0  -      Post-Treatment Pain Level 0  -         Lymphedema Assessment    Lymphedema Classification RUE:;at risk/stage 0  -      Lymphedema Cancer Related Sx right;lumpectomy;sentinel node biopsy  -      Lymphedema Surgery Comments 01/2024  -      Lymph Nodes Removed # 4  -MH      Positive Lymph Nodes # 0  -MH      Chemo Received no  -      Radiation Therapy Received yes  -      Radiation Treatments #/Timeframe 5  -MH         LLIS - Physical Concerns    The amount of pain associated with my lymphedema is: 0  -MH      The amount of limb heaviness associated with my lymphedema is: 0  -MH      The amount of skin tightness associated with my lymphedema is: 0  -MH    "   The size of my swollen limb(s) seems: 0  -MH      Lymphedema affects the movement of my swollen limb(s): 0  -MH      The strength in my swollen limb(s) is: 0  -MH         LLIS - Psychosocial Concerns    Lymphedema affects my body image (i.e., \"how I think I look\"). 0  -MH      Lymphedema affects my socializing with others. 0  -MH      Lymphedema affects my intimate relations with spouse or partner (rate 0 if not applicable 0  -MH      Lymphedema \"gets me down\" (i.e., depression, frustration, or anger) 0  -MH      I must rely on others for help due to my lymphedema. 0  -MH      I know what to do to manage my lymphedema 2  -MH         LLIS - Functional Concerns    Lymphedema affects my ability to perform self-care activities (i.e. eating, dressing, hygiene) 0  -MH      Lymphedema affects my ability to perform routine home or work-related activities. 0  -MH      Lymphedema affects my performance of preferred leisure activities. 0  -MH      Lymphedema affects proper fit of clothing/shoes 0  -MH      Lymphedema affects my sleep 0  -         Posture/Observations    Posture- WNL Posture is WNL  -         General ROM    GENERAL ROM COMMENTS BUE WFL  -         MMT (Manual Muscle Testing)    General MMT Comments BUE WFL  -         Skin Changes/Observations    Location/Assessment Upper Quadrant  -      Upper Quadrant Conditions right:;normal;intact;clean  -      Upper Quadrant Color/Pigment right:;normal  -         Lymphedema Measurements    Measurement Type(s) Volumetric  -      Volumetric Areas Upper extremities  -         L-Dex Bioimpedence Screening    L-Dex Measurement Extremity RUE  -      L-Dex Patient Position Standing  -      L-Dex UE Dominate Side Right  -      L-Dex UE At Risk Side Right  -      L-Dex UE Pre Surgical Value No  -      L-Dex UE Score 1.3  -      L-Dex UE Baseline Score 2.9  -      L-Dex UE Value Change -1.6  -MH      $ L-Dex Charge yes  -         Lymphedema Life " Impact Scale Totals    A.  Total Q1 - Q17 (Do not include Q18) 2  -MH      B.  Total number of questions answered (Q1-Q17) 17  -MH      C. Divide A by B 0.12  -MH      D. Multiple C by 25 3  -MH                User Key  (r) = Recorded By, (t) = Taken By, (c) = Cosigned By      Initials Name Provider Type    Liliana Mike OT Occupational Therapist                  The patient had a f/uSOZO measurement which I reviewed today. The score is   WFL, see scanned to EMR. Bioimpedance spectroscopy helps identify the   onset of lymphedema in an arm or leg before patients experience noticeable swelling. Research has   shown that 92% of patients with early detection of lymphedema using L-Dex combined with   intervention do not progress to chronic lymphedema through three years. Additionally, as of March 2023, the NCCN Guidelines® for Survivorship recommend proactive screening for lymphedema using   bioimpedance spectroscopy. Whenever possible, patients are tested for baseline L-Dex score before   cancer treatment begins and then are reassessed during regular follow-up visits using the SOZO device.   Otherwise, this can be started postoperatively and continued during regular follow-up visits. If the   patient’s L-Dex score increases above normal levels, that is a sign that lymphedema is developing and a   referral is made to physical therapy for further evaluation and early compression treatment.   Lymphedema assessment with the SOZO L-Dex score is recommended to be done every 3 months for   the first 3 years and then every 6 months for years 4 and 5 followed by annually afterwards            Therapy Education  Education Details: Educated on MLD, stretches, and s/s of lymphedema  Given: HEP, Symptoms/condition management, Edema management  Program: Reinforced  How Provided: Verbal  Provided to: Patient  Level of Understanding: Verbalized  90530 - OT Self Care/Mgmt Minutes: 39         OT Goals       Row Name 06/17/25 7217           Time Calculation    OT Goal Re-Cert Due Date 07/17/25  -               User Key  (r) = Recorded By, (t) = Taken By, (c) = Cosigned By      Initials Name Provider Type    Liliana Mike, DAVE Occupational Therapist                1. Post Breast Surgery Care/at risk for Lymphedema  LTG 1: 90 days:  As an indicator of no exacerbation of lymphedema staging, the patient will present with an L-Dex score less than [10] points from preoperative baseline.              STATUS: Met, on going  STG 1a:   30 days: To prevent exacerbation of mixed edema to lymphedema, patient will utilize the 2 postsurgical compression garments daily.                 STATUS: MET  STG 1b: 30 days: Patient will be independent with self-manual lymphatic massage.               STATUS: on going  STG 1c: 30 days:  Patient will be independent with identification of signs and symptoms of lymphedema exasperation per stoplight to recovery education handout.              STATUS: on going  STG 1 d: 30 days: Patient will be independent with HEP to prevent advancement in lymphedema staging.              STATUS: on going  TREATMENT:  Self Care/ADL retraining, Therapeutic Activity, Neuromuscular Re-education, Therapeutic Exercise, Bioimpedence Fluid Analysis, Post-Surgical compression garement [address] [employer] [facility] Cam[employer] Kit 2860K, Orthotic Management and training,  and Manual Therapy     OT Assessment/Plan       Row Name 06/17/25 0913          OT Assessment    Functional Limitations Limitations in functional capacity and performance  -     Impairments Impaired lymphatic circulation  -     Assessment Comments Karishma is a 70-year-old female with a diagnosis of invasive ductal carcinoma ER/NM positive HER2 negative of the right breast. Patient underwent a right lumpectomy on 1/29/2024 and had 4 lymph nodes removed. Patient also underwent 5 rounds of radiation to the right breast. Patient is demonstrating normalized lymphatic  functioning on this date in L-Dex scores are WFL. Pt reports she has not felt limited and has been gardening.Pt subjectively reports no concerns. Pt transitioning to 6 month follow ups. Encouraged to reach out with any changes prior to next appt. Patient will continue to benefit from skilled occupational therapy to further evaluate ongoing lymphatic functioning to prevent advancement in lymphedema staging, increased pain and decreased range of motion.  -     OT Diagnosis at risk for lymphedema  -     OT Rehab Potential Good  -     Patient/caregiver participated in establishment of treatment plan and goals Yes  -     Patient would benefit from skilled therapy intervention Yes  -        OT Plan    OT Frequency Other (comment)  see duration  -     Predicted Duration of Therapy Intervention (OT) re evaluate every 6 months.  -     Planned CPT's? OT EVAL LOW COMPLEXITY: 28077;OT RE-EVAL: 66406;OT THER ACT EA 15 MIN: 65584BI;OT THER PROC EA 15 MIN: 86089LD;OT SELF CARE/MGMT/TRAIN 15 MIN: 27589;OT MANUAL THERAPY EA 15 MIN: 02421;OT BIS XTRACELL FLUID ANALYSIS: 04597;OT CARE PLAN EA 15 MIN;OT ORTHOTIC MGMT/TRAIN EA 15 MIN: 14111;OT ORTHO/PROSTHET CHECKOUT EA 15 MIN: 94758  -     Planned Therapy Interventions (Optional Details) home exercise program;stretching;strengthening;ROM (Range of Motion);prosthetic fitting/training;orthotic fitting/training;patient/family education  -     OT Plan Comments continue POC  -               User Key  (r) = Recorded By, (t) = Taken By, (c) = Cosigned By      Initials Name Provider Type     Liliana Brown OT Occupational Therapist                              Time Calculation:   Timed Charges  13413 - OT Self Care/Mgmt Minutes: 39  Total Minutes  Timed Charges Total Minutes: 39   Total Minutes: 39     Therapy Charges for Today       Code Description Service Date Service Provider Modifiers Qty    95501598412 HC PT BIS XTRACELL FLUID ANALYSIS 6/17/2025 Liliana Brown  OT  1    [id number] HC OT SELF CARE/MGMT/TRAIN EA 15 MIN 6/17/2025 Liliana Brown OT GO 3                      Liliana Brown OT  6/17/2025

## 2025-07-01 ENCOUNTER — HOSPITAL ENCOUNTER (OUTPATIENT)
Facility: HOSPITAL | Age: 70
Setting detail: THERAPIES SERIES
Discharge: HOME | End: 2025-07-01
Payer: MEDICARE

## 2025-07-01 DIAGNOSIS — Z91.89 AT RISK FOR LYMPHEDEMA: Primary | ICD-10-CM

## 2025-07-01 DIAGNOSIS — Z17.0 MALIGNANT NEOPLASM OF UPPER-OUTER QUADRANT OF RIGHT BREAST IN FEMALE, ESTROGEN RECEPTOR POSITIVE: ICD-10-CM

## 2025-07-01 DIAGNOSIS — C50.411 MALIGNANT NEOPLASM OF UPPER-OUTER QUADRANT OF RIGHT BREAST IN FEMALE, ESTROGEN RECEPTOR POSITIVE: ICD-10-CM

## 2025-07-01 PROCEDURE — 97535 SELF CARE MNGMENT TRAINING: CPT

## 2025-07-01 PROCEDURE — 93702 BIS XTRACELL FLUID ANALYSIS: CPT

## 2025-07-01 NOTE — THERAPY RE-EVALUATION
Outpatient Occupational Therapy Lymphedema Re-Evaluation   Sean     Patient Name: Delores Kaur  : 1955  MRN: [id number]  Today's Date: 2025      Visit Date: 2025    Patient Active Problem List   Diagnosis    Thyroid nodule    Class 1 obesity due to excess calories without serious comorbidity with body mass index (BMI) of 33.0 to 33.9 in adult    Prediabetes    Dysphagia    GERD without esophagitis    Screening for malignant neoplasm of colon    Vitamin D deficiency    Mixed hyperlipidemia    Primary malignant neoplasm of central portion of right breast in female        Past Medical History:   Diagnosis Date    Broken bones     Right humerous    Cataract     Diabetes mellitus     Hypothyroidism     Obesity 1998    Osteopenia 2010    Thyroid nodule     Visual impairment contacts        Past Surgical History:   Procedure Laterality Date    BREAST LUMPECTOMY Right     with 3 lymph nodes removed.    BREAST SURGERY Left     BIOPSY    CATARACT EXTRACTION, BILATERAL       SECTION      COLONOSCOPY      COLONOSCOPY N/A 10/10/2022    Procedure: COLONOSCOPY;  Surgeon: James Camacho MD;  Location: [facility] ENDOSCOPY;  Service: General;  Laterality: N/A;  NORMAL COLONOSCOPY    ENDOSCOPY N/A 10/10/2022    Procedure: ESOPHAGOGASTRODUODENOSCOPY WITH BXS;  Surgeon: James Camacho MD;  Location: [facility] ENDOSCOPY;  Service: General;  Laterality: N/A;  ESOPHAGITIS    EYE SURGERY      retina sx    HYSTERECTOMY      TONSILLECTOMY  1969         Visit Dx:     ICD-10-CM ICD-9-CM   1. At risk for lymphedema  Z91.89 V49.89   2. 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  Patient underwent a right-sided lumpectomy on January 29, 2024 where she had 4 lymph nodes removed.  Patient subsequently underwent 5 rounds of radiation to the right breast.  -         Fall Risk Assessment    Any falls in the past year: Yes  -      Number of falls reported in the last 12 months 1  -      Factors that contributed to the fall: Tripped  -      Does patient have a fear of falling No  -         Services    Are you currently receiving Home Health services No  -      Do you plan to receive Home Health services in the near future No  -         Daily Activities    Primary Language English  -      Are you able to read Yes  -      Are you able to write Yes  -      How does patient learn best? Listening;Reading;Demonstration;Pictures/Video  -         Safety    Are you being hurt, hit, or frightened by anyone at home or in your life? No  -MH      Are you being neglected by a caregiver No  -      Have you had any of the following issues with N/A  -                User Key  (r) = Recorded By, (t) = Taken By, (c) = Cosigned By      Initials Name Provider Type     Liliana Brown, OT Occupational Therapist                     Lymphedema       Row Name 07/01/25 1100             Subjective Pain    Able to rate subjective pain? yes  -      Pre-Treatment Pain Level 4  -      Post-Treatment Pain Level 4  -      Subjective Pain Comment lower arm  -         Subjective    Subjective Comments Pt reports that she had a fall on thursday injuring her R wrist and has some swelling  -         Lymphedema Assessment    Lymphedema Classification RUE:;at risk/stage 0  -      Lymphedema Cancer Related Sx right;lumpectomy;sentinel node biopsy  -      Lymphedema Surgery Comments 01/2024  -      Lymph Nodes Removed # 4  -      Positive Lymph Nodes # 0  -      Chemo Received no  -      Radiation Therapy Received yes  -   "    Radiation Treatments #/Timeframe 5  -MH         LLIS - Physical Concerns    The amount of pain associated with my lymphedema is: 1  -MH      The amount of limb heaviness associated with my lymphedema is: 0  -MH      The amount of skin tightness associated with my lymphedema is: 0  -MH      The size of my swollen limb(s) seems: 1  -MH      Lymphedema affects the movement of my swollen limb(s): 0  -MH      The strength in my swollen limb(s) is: 0  -MH         LLIS - Psychosocial Concerns    Lymphedema affects my body image (i.e., \"how I think I look\"). 0  -MH      Lymphedema affects my socializing with others. 0  -MH      Lymphedema affects my intimate relations with spouse or partner (rate 0 if not applicable 0  -MH      Lymphedema \"gets me down\" (i.e., depression, frustration, or anger) 0  -MH      I must rely on others for help due to my lymphedema. 0  -MH      I know what to do to manage my lymphedema 2  -MH         LLIS - Functional Concerns    Lymphedema affects my ability to perform self-care activities (i.e. eating, dressing, hygiene) 0  -MH      Lymphedema affects my ability to perform routine home or work-related activities. 0  -MH      Lymphedema affects my performance of preferred leisure activities. 0  -MH      Lymphedema affects proper fit of clothing/shoes 0  -MH      Lymphedema affects my sleep 0  -MH         Posture/Observations    Posture- WNL Posture is WNL  -         Lymphedema Measurements    Measurement Type(s) Volumetric  -MH      Volumetric Areas Upper extremities  -         L-Dex Bioimpedence Screening    L-Dex Measurement Extremity RUE  -      L-Dex Patient Position Standing  -      L-Dex UE Dominate Side Right  -      L-Dex UE At Risk Side Right  -      L-Dex UE Pre Surgical Value No  -      L-Dex UE Score 1.9  -MH      L-Dex UE Baseline Score 2.9  -MH      L-Dex UE Value Change -1  -MH      $ L-Dex Charge yes  -         Lymphedema Life Impact Scale Totals    A.  Total Q1 " - Q17 (Do not include Q18) 4  -MH      B.  Total number of questions answered (Q1-Q17) 17  -MH      C. Divide A by B 0.24  -MH      D. Multiple C by 25 6  -MH                User Key  (r) = Recorded By, (t) = Taken By, (c) = Cosigned By      Initials Name Provider Type     Liliana Brown, OT Occupational Therapist                  The patient had a f/u SOZO measurement which I reviewed today. The score is   WFL, see scanned to EMR. Bioimpedance spectroscopy helps identify the   onset of lymphedema in an arm or leg before patients experience noticeable swelling. Research has   shown that 92% of patients with early detection of lymphedema using L-Dex combined with   intervention do not progress to chronic lymphedema through three years. Additionally, as of March 2023, the NCCN Guidelines® for Survivorship recommend proactive screening for lymphedema using   bioimpedance spectroscopy. Whenever possible, patients are tested for baseline L-Dex score before   cancer treatment begins and then are reassessed during regular follow-up visits using the SOZO device.   Otherwise, this can be started postoperatively and continued during regular follow-up visits. If the   patient’s L-Dex score increases above normal levels, that is a sign that lymphedema is developing and a   referral is made to physical therapy for further evaluation and early compression treatment.   Lymphedema assessment with the SOZO L-Dex score is recommended to be done every 3 months for   the first 3 years and then every 6 months for years 4 and 5 followed by annually afterwards              Therapy Education  Education Details: reviewed MLD, s/s of lymphedema and tubi  wear schedule.  Given: HEP, Symptoms/condition management, Edema management  Program: Reinforced  How Provided: Verbal  Provided to: Patient  Level of Understanding: Verbalized  64529 - OT Self Care/Mgmt Minutes: 30         OT Goals       Row Name 07/01/25 1225          Time  Calculation    OT Goal Re-Cert Due Date 07/31/25  -               User Key  (r) = Recorded By, (t) = Taken By, (c) = Cosigned By      Initials Name Provider Type    Liliana Mike, DAVE Occupational Therapist                  1. Post Breast Surgery Care/at risk for Lymphedema  LTG 1: 90 days:  As an indicator of no exacerbation of lymphedema staging, the patient will present with an L-Dex score less than [10] points from preoperative baseline.              STATUS: Met, on going  STG 1a:   30 days: To prevent exacerbation of mixed edema to lymphedema, patient will utilize the 2 postsurgical compression garments daily.                 STATUS: MET  STG 1b: 30 days: Patient will be independent with self-manual lymphatic massage.               STATUS: on going  STG 1c: 30 days:  Patient will be independent with identification of signs and symptoms of lymphedema exasperation per stoplight to recovery education handout.              STATUS: on going  STG 1 d: 30 days: Patient will be independent with HEP to prevent advancement in lymphedema staging.              STATUS: on going  TREATMENT:  Self Care/ADL retraining, Therapeutic Activity, Neuromuscular Re-education, Therapeutic Exercise, Bioimpedence Fluid Analysis, Post-Surgical compression garement [address] [address] [facility]/ Liliana Camisole Kit 2860K, Orthotic Management and training,  and Manual Therapy     OT Assessment/Plan       Row Name 07/01/25 1223          OT Assessment    Functional Limitations Limitations in functional capacity and performance  -     Impairments Impaired lymphatic circulation  -     Assessment Comments Karishma is a 70-year-old female with a diagnosis of invasive ductal carcinoma ER/CT positive HER2 negative of the right breast. Patient underwent a right lumpectomy on 1/29/2024 and had 4 lymph nodes removed. Patient also underwent 5 rounds of radiation to the right breast. Pt reports that she had a fall tripping over garden hose and injured  R wrist, no fx. Pt has notable swelling in R UE. Pt provided with tubi  to wear during the day x1 week and will follow up in 2 weeks.  Patient is demonstrating normalized lymphatic functioning on this date in L-Dex scores are WFL. Encouraged to reach out with any changes prior to next appt. Patient will continue to benefit from skilled occupational therapy to further evaluate ongoing lymphatic functioning to prevent advancement in lymphedema staging, increased pain and decreased range of motion.  -     OT Diagnosis at risk for lymphedema.  -     OT Rehab Potential Good  -     Patient/caregiver participated in establishment of treatment plan and goals Yes  -     Patient would benefit from skilled therapy intervention Yes  -        OT Plan    OT Frequency Other (comment)  see duration  -     Predicted Duration of Therapy Intervention (OT) f/u in 2 weeks  -     Planned CPT's? OT EVAL LOW COMPLEXITY: 18313;OT RE-EVAL: 01344;OT THER ACT EA 15 MIN: 61234YB;OT THER PROC EA 15 MIN: [id number];OT SELF CARE/MGMT/TRAIN 15 MIN: 87307;OT MANUAL THERAPY EA 15 MIN: 82260;OT BIS XTRACELL FLUID ANALYSIS: 82647;OT CARE PLAN EA 15 MIN;OT ORTHOTIC MGMT/TRAIN EA 15 MIN: 77959;OT ORTHO/PROSTHET CHECKOUT EA 15 MIN: 30407  -     Planned Therapy Interventions (Optional Details) home exercise program;stretching;strengthening;ROM (Range of Motion);prosthetic fitting/training;orthotic fitting/training;patient/family education  -     OT Plan Comments continue POC  -               User Key  (r) = Recorded By, (t) = Taken By, (c) = Cosigned By      Initials Name Provider Type     Liliana Brown OT Occupational Therapist                              Time Calculation:   Timed Charges  54623 - OT Self Care/Mgmt Minutes: 30  Total Minutes  Timed Charges Total Minutes: 30   Total Minutes: 30     Therapy Charges for Today       Code Description Service Date Service Provider Modifiers Qty    62969542051 HC PT BIS XTRACELL FLUID  ANALYSIS 7/1/2025 Liliana Brown OT  1    02025147427 HC OT SELF CARE/MGMT/TRAIN EA 15 MIN 7/1/2025 Liliana Brown OT GO 2                      Liliana Brown OT  7/1/2025

## 2025-07-17 ENCOUNTER — HOSPITAL ENCOUNTER (OUTPATIENT)
Facility: HOSPITAL | Age: 70
Setting detail: THERAPIES SERIES
Discharge: HOME | End: 2025-07-17
Payer: MEDICARE

## 2025-07-17 DIAGNOSIS — Z91.89 AT RISK FOR LYMPHEDEMA: Primary | ICD-10-CM

## 2025-07-17 DIAGNOSIS — Z17.0 MALIGNANT NEOPLASM OF UPPER-OUTER QUADRANT OF RIGHT BREAST IN FEMALE, ESTROGEN RECEPTOR POSITIVE: ICD-10-CM

## 2025-07-17 DIAGNOSIS — C50.411 MALIGNANT NEOPLASM OF UPPER-OUTER QUADRANT OF RIGHT BREAST IN FEMALE, ESTROGEN RECEPTOR POSITIVE: ICD-10-CM

## 2025-07-17 PROCEDURE — 97535 SELF CARE MNGMENT TRAINING: CPT

## 2025-07-17 NOTE — THERAPY TREATMENT NOTE
Outpatient Occupational Therapy Lymphedema Treatment Note   Estrada     Patient Name: Delores Kaur  : 1955  MRN: [id number]  Today's Date: 2025      Visit Date: 2025    Patient Active Problem List   Diagnosis    Thyroid nodule    Class 1 obesity due to excess calories without serious comorbidity with body mass index (BMI) of 33.0 to 33.9 in adult    Prediabetes    Dysphagia    GERD without esophagitis    Screening for malignant neoplasm of colon    Vitamin D deficiency    Mixed hyperlipidemia    Primary malignant neoplasm of central portion of right breast in female        Past Medical History:   Diagnosis Date    Broken bones     Right humerous    Cataract     Diabetes mellitus     Hypothyroidism     Obesity 1998    Osteopenia 2010    Thyroid nodule     Visual impairment contacts        Past Surgical History:   Procedure Laterality Date    BREAST LUMPECTOMY Right     with 3 lymph nodes removed.    BREAST SURGERY Left     BIOPSY    CATARACT EXTRACTION, BILATERAL       SECTION      COLONOSCOPY      COLONOSCOPY N/A 10/10/2022    Procedure: COLONOSCOPY;  Surgeon: James Camacho MD;  Location: [facility] ENDOSCOPY;  Service: General;  Laterality: N/A;  NORMAL COLONOSCOPY    ENDOSCOPY N/A 10/10/2022    Procedure: ESOPHAGOGASTRODUODENOSCOPY WITH BXS;  Surgeon: James Camacho MD;  Location: [facility] ENDOSCOPY;  Service: General;  Laterality: N/A;  ESOPHAGITIS    EYE SURGERY      retina sx    HYSTERECTOMY      TONSILLECTOMY  1969         Visit Dx:      ICD-10-CM ICD-9-CM   1. At risk for lymphedema  Z91.89 V49.89   2. Malignant neoplasm of upper-outer quadrant of right breast in female, estrogen receptor positive  C50.411 174.4    Z17.0 V86.0        Lymphedema       Row Name 25 1000             Subjective Pain    Able to rate subjective pain? yes  -SC      Pre-Treatment Pain Level 0  -SC      Post-Treatment Pain Level 0  -SC      Subjective Pain Comment Pt denies pain  -SC          Subjective    Subjective Comments Pt states that the swelling in her R wrist has gone away and has been doing really well  -SC         Skin Changes/Observations    Location/Assessment Upper Quadrant  -SC      Upper Quadrant Conditions right:;normal;intact;clean  -SC      Upper Quadrant Color/Pigment right:;normal  -SC         Lymphedema Measurements    Measurement Type(s) Circumferential  -SC      Volumetric Areas Upper extremities  -SC         L-Dex Bioimpedence Screening    L-Dex Measurement Extremity RUE  -SC      L-Dex Patient Position Standing  -SC      L-Dex UE Dominate Side Right  -SC      L-Dex UE At Risk Side Right  -SC      L-Dex UE Pre Surgical Value No  -SC      L-Dex UE Score 1  -SC      L-Dex UE Baseline Score 2.9  -SC      L-Dex UE Value Change -1.9  -SC                User Key  (r) = Recorded By, (t) = Taken By, (c) = Cosigned By      Initials Name Provider Type    SC Zohreh Villa COTA Occupational Therapist Assistant                Circumferential Measurements                   OT Assessment/Plan       Row Name 07/17/25 1034          OT Assessment    Assessment Comments Karishma is a 70-year-old female with a diagnosis of invasive ductal carcinoma ER/PA positive HER2 negative of the right breast. Patient underwent a right lumpectomy on 1/29/2024 and had 4 lymph nodes removed. Patient also underwent 5 rounds of radiation to the right breast. Pt reports that she has had a decrease in swelling in R UE. Patient is demonstrating normalized lymphatic functioning on this date in L-Dex scores are WFL. Patient will continue to benefit from skilled occupational therapy to further evaluate ongoing lymphatic functioning to prevent advancement in lymphedema staging, increased pain and decreased range of motion with f/u in 3 months.  -SC     Patient would benefit from skilled therapy intervention Yes  -SC               User Key  (r) = Recorded By, (t) = Taken By, (c) = Cosigned By      Initials Name  Provider Type    SC Zohreh Villa COTA Occupational Therapist Assistant                              Therapy Education  Education Details: Therapist educating patient on stoplight program and signs and symptoms of infection.  Therapist encouraging patient to contact oncology rehab if any issues arise prior to next appointment  Program: Reinforced  How Provided: Verbal  Provided to: Patient  Level of Understanding: Verbalized  49596 - OT Self Care/Mgmt Minutes: 21                Time Calculation:   Timed Charges  13647 - OT Self Care/Mgmt Minutes: 21  Total Minutes  Timed Charges Total Minutes: 21   Total Minutes: 21     Therapy Charges for Today       Code Description Service Date Service Provider Modifiers Qty    58906335210  OT SELF CARE/MGMT/TRAIN EA 15 MIN 7/17/2025 Zohreh Villa COTA GO 1                        ANA Worrell  7/17/2025

## (undated) DEVICE — GLV SURG SENSICARE PI ORTHO SZ8 LF STRL

## (undated) DEVICE — CONN JET HYDRA H20 AUXILIARY DISP

## (undated) DEVICE — Device: Brand: DEFENDO AIR/WATER/SUCTION AND BIOPSY VALVE

## (undated) DEVICE — Device

## (undated) DEVICE — GOWN,REINFRCE,POLY,SIRUS,BREATH SLV,XXLG: Brand: MEDLINE

## (undated) DEVICE — BLCK/BITE BLOX WO/DENTL/RIM W/STRAP 54F

## (undated) DEVICE — STERILE POLYISOPRENE POWDER-FREE SURGICAL GLOVES WITH EMOLLIENT COATING: Brand: PROTEXIS

## (undated) DEVICE — SOLIDIFIER LIQLOC PLS 1500CC BT

## (undated) DEVICE — SOL IRR NACL 0.9PCT BT 1000ML

## (undated) DEVICE — LINER SURG CANSTR SXN S/RIGD 1500CC

## (undated) DEVICE — SINGLE-USE BIOPSY FORCEPS: Brand: RADIAL JAW 4

## (undated) DEVICE — Device: Brand: DISPOSABLE ELECTROSURGICAL SNARE

## (undated) DEVICE — SOL IRRG H2O PL/BG 1000ML STRL